# Patient Record
Sex: MALE | Race: WHITE | NOT HISPANIC OR LATINO | Employment: UNEMPLOYED | URBAN - METROPOLITAN AREA
[De-identification: names, ages, dates, MRNs, and addresses within clinical notes are randomized per-mention and may not be internally consistent; named-entity substitution may affect disease eponyms.]

---

## 2023-01-13 DIAGNOSIS — M79.641 RIGHT HAND PAIN: Primary | ICD-10-CM

## 2023-01-18 ENCOUNTER — OFFICE VISIT (OUTPATIENT)
Dept: ORTHOPEDICS | Facility: CLINIC | Age: 28
End: 2023-01-18
Payer: MEDICAID

## 2023-01-18 ENCOUNTER — HOSPITAL ENCOUNTER (OUTPATIENT)
Dept: RADIOLOGY | Facility: HOSPITAL | Age: 28
Discharge: HOME | End: 2023-01-18
Attending: STUDENT IN AN ORGANIZED HEALTH CARE EDUCATION/TRAINING PROGRAM
Payer: MEDICAID

## 2023-01-18 VITALS
DIASTOLIC BLOOD PRESSURE: 77 MMHG | HEART RATE: 67 BPM | TEMPERATURE: 99 F | HEIGHT: 68 IN | BODY MASS INDEX: 29.61 KG/M2 | RESPIRATION RATE: 18 BRPM | WEIGHT: 195.38 LBS | SYSTOLIC BLOOD PRESSURE: 116 MMHG

## 2023-01-18 DIAGNOSIS — S64.40XA DIGITAL NERVE LACERATION, FINGER, INITIAL ENCOUNTER: ICD-10-CM

## 2023-01-18 DIAGNOSIS — S66.821A FLEXOR TENDON LACERATION OF RIGHT HAND WITH OPEN WOUND, INITIAL ENCOUNTER: ICD-10-CM

## 2023-01-18 DIAGNOSIS — S61.401A FLEXOR TENDON LACERATION OF RIGHT HAND WITH OPEN WOUND, INITIAL ENCOUNTER: ICD-10-CM

## 2023-01-18 DIAGNOSIS — M79.641 RIGHT HAND PAIN: Primary | ICD-10-CM

## 2023-01-18 DIAGNOSIS — S66.120A LACERATION OF FLEXOR MUSCLE, FASCIA AND TENDON OF RIGHT INDEX FINGER AT WRIST AND HAND LEVEL, INITIAL ENCOUNTER: Primary | ICD-10-CM

## 2023-01-18 DIAGNOSIS — S66.124A LACERATION OF FLEXOR MUSCLE, FASCIA AND TENDON OF RIGHT RING FINGER AT WRIST AND HAND LEVEL, INITIAL ENCOUNTER: ICD-10-CM

## 2023-01-18 DIAGNOSIS — S66.122A LACERATION OF FLEXOR MUSCLE, FASCIA AND TENDON OF RIGHT MIDDLE FINGER AT WRIST AND HAND LEVEL, INITIAL ENCOUNTER: ICD-10-CM

## 2023-01-18 DIAGNOSIS — S66.126A LACERATION OF FLEXOR MUSCLE, FASCIA AND TENDON OF RIGHT LITTLE FINGER AT WRIST AND HAND LEVEL, INITIAL ENCOUNTER: ICD-10-CM

## 2023-01-18 DIAGNOSIS — S66.821A LACERATION OF FLEXOR TENDON OF RIGHT HAND, INITIAL ENCOUNTER: ICD-10-CM

## 2023-01-18 DIAGNOSIS — M79.641 RIGHT HAND PAIN: ICD-10-CM

## 2023-01-18 PROCEDURE — 73130 X-RAY EXAM OF HAND: CPT | Mod: TC,RT

## 2023-01-18 PROCEDURE — 3008F BODY MASS INDEX DOCD: CPT | Mod: CPTII,,, | Performed by: SPECIALIST

## 2023-01-18 PROCEDURE — 1159F PR MEDICATION LIST DOCUMENTED IN MEDICAL RECORD: ICD-10-PCS | Mod: CPTII,,, | Performed by: STUDENT IN AN ORGANIZED HEALTH CARE EDUCATION/TRAINING PROGRAM

## 2023-01-18 PROCEDURE — 3078F PR MOST RECENT DIASTOLIC BLOOD PRESSURE < 80 MM HG: ICD-10-PCS | Mod: CPTII,,, | Performed by: SPECIALIST

## 2023-01-18 PROCEDURE — 3074F SYST BP LT 130 MM HG: CPT | Mod: CPTII,,, | Performed by: SPECIALIST

## 2023-01-18 PROCEDURE — 3078F DIAST BP <80 MM HG: CPT | Mod: CPTII,,, | Performed by: SPECIALIST

## 2023-01-18 PROCEDURE — 71046 X-RAY EXAM CHEST 2 VIEWS: CPT | Mod: TC

## 2023-01-18 PROCEDURE — 99203 PR OFFICE/OUTPT VISIT, NEW, LEVL III, 30-44 MIN: ICD-10-PCS | Mod: S$PBB,,, | Performed by: SPECIALIST

## 2023-01-18 PROCEDURE — 3074F PR MOST RECENT SYSTOLIC BLOOD PRESSURE < 130 MM HG: ICD-10-PCS | Mod: CPTII,,, | Performed by: SPECIALIST

## 2023-01-18 PROCEDURE — 99204 OFFICE O/P NEW MOD 45 MIN: CPT | Mod: ,,, | Performed by: STUDENT IN AN ORGANIZED HEALTH CARE EDUCATION/TRAINING PROGRAM

## 2023-01-18 PROCEDURE — 3008F PR BODY MASS INDEX (BMI) DOCUMENTED: ICD-10-PCS | Mod: CPTII,,, | Performed by: SPECIALIST

## 2023-01-18 PROCEDURE — 99213 OFFICE O/P EST LOW 20 MIN: CPT | Mod: PBBFAC,25

## 2023-01-18 PROCEDURE — 99203 OFFICE O/P NEW LOW 30 MIN: CPT | Mod: S$PBB,,, | Performed by: SPECIALIST

## 2023-01-18 PROCEDURE — 99204 PR OFFICE/OUTPT VISIT, NEW, LEVL IV, 45-59 MIN: ICD-10-PCS | Mod: ,,, | Performed by: STUDENT IN AN ORGANIZED HEALTH CARE EDUCATION/TRAINING PROGRAM

## 2023-01-18 PROCEDURE — 1159F MED LIST DOCD IN RCRD: CPT | Mod: CPTII,,, | Performed by: STUDENT IN AN ORGANIZED HEALTH CARE EDUCATION/TRAINING PROGRAM

## 2023-01-18 RX ORDER — HYDROCODONE BITARTRATE AND ACETAMINOPHEN 7.5; 325 MG/1; MG/1
1 TABLET ORAL EVERY 6 HOURS PRN
COMMUNITY
Start: 2023-01-10

## 2023-01-18 RX ORDER — SULFAMETHOXAZOLE AND TRIMETHOPRIM 800; 160 MG/1; MG/1
1 TABLET ORAL 2 TIMES DAILY
COMMUNITY
Start: 2023-01-10 | End: 2023-01-20

## 2023-01-18 RX ORDER — SODIUM CHLORIDE 9 MG/ML
INJECTION, SOLUTION INTRAVENOUS CONTINUOUS
Status: CANCELLED | OUTPATIENT
Start: 2023-01-18

## 2023-01-18 NOTE — PROGRESS NOTES
[facility] Orthopaedic Surgery Clinic Note    In brief, Lul Porter is a 27 y.o. male who sustained a volar laceration to the small, ring, middle, index fingers when trying to carry a sharp piece of glass on 01/10/2023 during a home demolition/renovation.  States he was seen at an outside facility, where his wounds were irrigated and closed.  Was seen by a nonoperative orthopedic physician in [place] we then referred him to [facility], however when they contacted [facility] he was told that he would be several weeks before he he could get an appointment, so he traveled to [place] for further evaluation.  He denies fevers or chills or malodorous drainage.  He states he does have gross weakness in all fingers except the thumb with flexion however he does have full extension.  He does have some decreased sensation in the small finger and index finger.    He is right-hand dominant and works in construction.      PMH:   Past Medical History:   Diagnosis Date    Mild intermittent asthma, uncomplicated        PSH: History reviewed. No pertinent surgical history.    SH:   Social History     Socioeconomic History    Marital status: Single   Tobacco Use    Smoking status: Every Day     Types: Cigarettes    Smokeless tobacco: Current   Substance and Sexual Activity    Alcohol use: Never    Drug use: Never    Sexual activity: Yes       FH:   Family History   Problem Relation Age of Onset    Throat cancer Mother     Heart attack Father        Allergies: Review of patient's allergies indicates:  No Known Allergies    ROS:  Negative except for above    Physical Exam:  Vitals:    01/18/23 1327   BP: 116/77   Pulse: 67   Resp: 18   Temp: 98.5 °F (36.9 °C)       General: NAD  Cardio: RRR by peripheral pulse  Pulm: Normal WOB on room air, symmetric chest rise      MSK:    Right hand exam   Lacerations to zone 2 volar surface of small, ring, long, index fingers with nylon sutures in place, no gross purulence, no fluctuance, no increased  warmth  Decreased sensation to radial aspect of small finger and ulnar aspect of the index finger, otherwise gross sensation intact to all fingertips  Not able to flex at PIP or DP of small, ring, middle, index fingers;   Extension of all fingers at MCP, PIP, DI P grossly intact  AIN/PIN/ulnar nerve gross motor intact  Capillary refill brisk in all fingertips, radial pulse palpable      Imaging:   X-rays of the right hand demonstrate no obvious foreign bodies, no fractures, normal alignment of radiocarpal joint    Assessment:   27-year-old right-hand dominant male with zone 2 laceration of right small, ring, middle, index fingers with possible digital nerve injury to the small and index fingers, which occurred grabbing a sharp edge of glass on 1/10/23.  He was referred here today for initial surgical discussion, he was evaluated in [place] and referred to our clinic in [place].    -case discussed with Dr. Villanueva, who contacted a hand specialist within his practice for further management, we will refer the patient to his clinic today for surgical discussion    Niels Polanco MD  [facility]

## 2023-01-18 NOTE — PROGRESS NOTES
Chief Complaint:  Right hand lacerations to index, middle, ring, small fingers    Consulting Physician: No ref. provider found    History of present illness:    Patient is a 27-year-old right-hand-dominant male who injured his hand on a mirror on 01/10/2023.  He presented to the [facility] and was referred to me.  He complains of inability to flex his index, middle, ring, small fingers.  He is also unable to feel at the fingertips of all 4 digits.  He smokes currently.    Past Medical History:   Diagnosis Date    Mild intermittent asthma, uncomplicated        History reviewed. No pertinent surgical history.    Current Outpatient Medications   Medication Sig    HYDROcodone-acetaminophen (NORCO) 7.5-325 mg per tablet Take 1 tablet by mouth every 6 (six) hours as needed.    sulfamethoxazole-trimethoprim 800-160mg (BACTRIM DS) 800-160 mg Tab Take by mouth.     No current facility-administered medications for this visit.       Review of patient's allergies indicates:  No Known Allergies    Family History   Problem Relation Age of Onset    Throat cancer Mother     Heart attack Father        Social History     Socioeconomic History    Marital status: Single   Tobacco Use    Smoking status: Every Day     Types: Cigarettes    Smokeless tobacco: Current   Substance and Sexual Activity    Alcohol use: Never    Drug use: Never    Sexual activity: Yes       Review of Systems:    Constitution:   Denies chills, fever, and sweats.  HENT:   Denies headaches or blurry vision.  Cardiovascular:  Denies chest pain or irregular heart beat.  Respiratory:   Denies cough or shortness of breath.  Gastrointestinal:  Denies abdominal pain, nausea, or vomiting.  Musculoskeletal:   Denies muscle cramps.  Neurological:   Denies dizziness or focal weakness.  Psychiatric/Behavior: Normal mental status.  Hematology/Lymph:  Denies bleeding problem or easy bruising/bleeding.  Skin:    Denies rash or suspicious  lesions.    Examination:    Vital Signs:    Vitals:    01/18/23 1457   PainSc:   7       There is no height or weight on file to calculate BMI.    Constitution:   Well-developed, well nourished patient in no acute distress.  Neurological:   Alert and oriented x 3 and cooperative to examination.     Psychiatric/Behavior: Normal mental status.  Respiratory:   No shortness of breath.  Eyes:    Extraoccular muscles intact  Skin:    No scars, rash or suspicious lesions.    MSK:   Right hand:  Traumatic wounds over the volar aspect of all 4 digits in zone 2.  He is unable to flex his FDP or FDS of all 4 digits.  Two-point discrimination is greater than 15 on the radial side and ulnar side of all 4 digits except for the radial aspect of the ring finger where two-point discrimination is 8 mm.  All digits are warm and well perfused.    Imaging:   X-ray of the right hand shows no fractures     Assessment:  Zone 2 flexor tendon lacerations in index, middle, ring, small fingers.  Digital nerve lacerations in index, middle, ring, small fingers    Plan:  Will need an exploration of all 4 fingers with zone 2 flexor tendon repairs in index, middle, ring, small fingers.  I will also examined the nerve vascular bundles intraoperatively.  I will attempt to repair the nerves if possible but he may need nerve reconstructions with allograft.    I explained that surgery and the nature of their condition are not without risks. These include, but are not limited to, bleeding, infection, neurovascular compromise, wound complications, scarring, cosmetic defects, need for later and/or repeated surgeries, pain, loss of ROM, loss of function, deformity, functional abnormalities, stiffness, thromboembolic complications, compartment syndrome, loss of limb, loss of life, anesthetic complications, and other imponderables. I explained that these can occur despite the adequacy of treatments rendered, and that their risks are heightened given the  nature of their condition. They verbalized understanding. They would like to continue with surgery at this time. If appropriate family was involved with surgical discussion.     Follow Up:  After surgery  Xray at next visit:  None

## 2023-01-18 NOTE — H&P (VIEW-ONLY)
Chief Complaint:  Right hand lacerations to index, middle, ring, small fingers    Consulting Physician: No ref. provider found    History of present illness:    Patient is a 27-year-old right-hand-dominant male who injured his hand on a mirror on 01/10/2023.  He presented to the [facility] and was referred to me.  He complains of inability to flex his index, middle, ring, small fingers.  He is also unable to feel at the fingertips of all 4 digits.  He smokes currently.    Past Medical History:   Diagnosis Date    Mild intermittent asthma, uncomplicated        History reviewed. No pertinent surgical history.    Current Outpatient Medications   Medication Sig    HYDROcodone-acetaminophen (NORCO) 7.5-325 mg per tablet Take 1 tablet by mouth every 6 (six) hours as needed.    sulfamethoxazole-trimethoprim 800-160mg (BACTRIM DS) 800-160 mg Tab Take by mouth.     No current facility-administered medications for this visit.       Review of patient's allergies indicates:  No Known Allergies    Family History   Problem Relation Age of Onset    Throat cancer Mother     Heart attack Father        Social History     Socioeconomic History    Marital status: Single   Tobacco Use    Smoking status: Every Day     Types: Cigarettes    Smokeless tobacco: Current   Substance and Sexual Activity    Alcohol use: Never    Drug use: Never    Sexual activity: Yes       Review of Systems:    Constitution:   Denies chills, fever, and sweats.  HENT:   Denies headaches or blurry vision.  Cardiovascular:  Denies chest pain or irregular heart beat.  Respiratory:   Denies cough or shortness of breath.  Gastrointestinal:  Denies abdominal pain, nausea, or vomiting.  Musculoskeletal:   Denies muscle cramps.  Neurological:   Denies dizziness or focal weakness.  Psychiatric/Behavior: Normal mental status.  Hematology/Lymph:  Denies bleeding problem or easy bruising/bleeding.  Skin:    Denies rash or suspicious  lesions.    Examination:    Vital Signs:    Vitals:    01/18/23 1457   PainSc:   7       There is no height or weight on file to calculate BMI.    Constitution:   Well-developed, well nourished patient in no acute distress.  Neurological:   Alert and oriented x 3 and cooperative to examination.     Psychiatric/Behavior: Normal mental status.  Respiratory:   No shortness of breath.  Eyes:    Extraoccular muscles intact  Skin:    No scars, rash or suspicious lesions.    MSK:   Right hand:  Traumatic wounds over the volar aspect of all 4 digits in zone 2.  He is unable to flex his FDP or FDS of all 4 digits.  Two-point discrimination is greater than 15 on the radial side and ulnar side of all 4 digits except for the radial aspect of the ring finger where two-point discrimination is 8 mm.  All digits are warm and well perfused.    Imaging:   X-ray of the right hand shows no fractures     Assessment:  Zone 2 flexor tendon lacerations in index, middle, ring, small fingers.  Digital nerve lacerations in index, middle, ring, small fingers    Plan:  Will need an exploration of all 4 fingers with zone 2 flexor tendon repairs in index, middle, ring, small fingers.  I will also examined the nerve vascular bundles intraoperatively.  I will attempt to repair the nerves if possible but he may need nerve reconstructions with allograft.    I explained that surgery and the nature of their condition are not without risks. These include, but are not limited to, bleeding, infection, neurovascular compromise, wound complications, scarring, cosmetic defects, need for later and/or repeated surgeries, pain, loss of ROM, loss of function, deformity, functional abnormalities, stiffness, thromboembolic complications, compartment syndrome, loss of limb, loss of life, anesthetic complications, and other imponderables. I explained that these can occur despite the adequacy of treatments rendered, and that their risks are heightened given the  nature of their condition. They verbalized understanding. They would like to continue with surgery at this time. If appropriate family was involved with surgical discussion.     Follow Up:  After surgery  Xray at next visit:  None

## 2023-01-19 ENCOUNTER — ANESTHESIA EVENT (OUTPATIENT)
Dept: SURGERY | Facility: HOSPITAL | Age: 28
End: 2023-01-19
Payer: MEDICAID

## 2023-01-19 NOTE — PROGRESS NOTES
Faculty Attestation: Lul Porter  was seen at [facility] in the Orthopaedic Clinic. Patient seen and evaluated at the time of the visit. History of Present Illness, Physical Exam, and Assessment and Plan reviewed. Treatment plan is reasonable and appropriate. Compliance with treatment recommendations is important. No procedure was performed.     Mario Villanueva MD  Orthopaedic Surgery

## 2023-01-20 ENCOUNTER — ANESTHESIA (OUTPATIENT)
Dept: SURGERY | Facility: HOSPITAL | Age: 28
End: 2023-01-20
Payer: MEDICAID

## 2023-01-20 ENCOUNTER — HOSPITAL ENCOUNTER (OUTPATIENT)
Facility: HOSPITAL | Age: 28
Discharge: HOME | End: 2023-01-20
Attending: STUDENT IN AN ORGANIZED HEALTH CARE EDUCATION/TRAINING PROGRAM | Admitting: STUDENT IN AN ORGANIZED HEALTH CARE EDUCATION/TRAINING PROGRAM
Payer: MEDICAID

## 2023-01-20 DIAGNOSIS — S66.120A LACERATION OF FLEXOR MUSCLE, FASCIA AND TENDON OF RIGHT INDEX FINGER AT WRIST AND HAND LEVEL, INITIAL ENCOUNTER: ICD-10-CM

## 2023-01-20 DIAGNOSIS — S61.401A FLEXOR TENDON LACERATION OF RIGHT HAND WITH OPEN WOUND, INITIAL ENCOUNTER: ICD-10-CM

## 2023-01-20 DIAGNOSIS — S66.821A FLEXOR TENDON LACERATION OF RIGHT HAND WITH OPEN WOUND, INITIAL ENCOUNTER: ICD-10-CM

## 2023-01-20 DIAGNOSIS — S66.821A LACERATION OF FLEXOR TENDON OF RIGHT HAND, INITIAL ENCOUNTER: Primary | ICD-10-CM

## 2023-01-20 PROCEDURE — 63600175 PHARM REV CODE 636 W HCPCS: Performed by: NURSE ANESTHETIST, CERTIFIED REGISTERED

## 2023-01-20 PROCEDURE — 64831 PR REPAIR OF DIGIT NERVE: ICD-10-PCS | Mod: 59,RT,, | Performed by: STUDENT IN AN ORGANIZED HEALTH CARE EDUCATION/TRAINING PROGRAM

## 2023-01-20 PROCEDURE — 76942 ECHO GUIDE FOR BIOPSY: CPT | Performed by: ANESTHESIOLOGY

## 2023-01-20 PROCEDURE — 63600175 PHARM REV CODE 636 W HCPCS: Performed by: STUDENT IN AN ORGANIZED HEALTH CARE EDUCATION/TRAINING PROGRAM

## 2023-01-20 PROCEDURE — 26356 PR REPAIR FLEX TENDON,ZONE 2,HAND: ICD-10-PCS | Mod: 51,F6,, | Performed by: STUDENT IN AN ORGANIZED HEALTH CARE EDUCATION/TRAINING PROGRAM

## 2023-01-20 PROCEDURE — 36000707: Performed by: STUDENT IN AN ORGANIZED HEALTH CARE EDUCATION/TRAINING PROGRAM

## 2023-01-20 PROCEDURE — 63600175 PHARM REV CODE 636 W HCPCS

## 2023-01-20 PROCEDURE — 64831 REPAIR OF DIGIT NERVE: CPT | Mod: 59,RT,, | Performed by: STUDENT IN AN ORGANIZED HEALTH CARE EDUCATION/TRAINING PROGRAM

## 2023-01-20 PROCEDURE — 71000015 HC POSTOP RECOV 1ST HR: Performed by: STUDENT IN AN ORGANIZED HEALTH CARE EDUCATION/TRAINING PROGRAM

## 2023-01-20 PROCEDURE — 26356 REPAIR FINGER/HAND TENDON: CPT | Mod: 51,F6,, | Performed by: STUDENT IN AN ORGANIZED HEALTH CARE EDUCATION/TRAINING PROGRAM

## 2023-01-20 PROCEDURE — 37000008 HC ANESTHESIA 1ST 15 MINUTES: Performed by: STUDENT IN AN ORGANIZED HEALTH CARE EDUCATION/TRAINING PROGRAM

## 2023-01-20 PROCEDURE — 64912 NRV RPR W/NRV ALGRFT 1ST: CPT | Mod: RT,,, | Performed by: STUDENT IN AN ORGANIZED HEALTH CARE EDUCATION/TRAINING PROGRAM

## 2023-01-20 PROCEDURE — 27201423 OPTIME MED/SURG SUP & DEVICES STERILE SUPPLY: Performed by: STUDENT IN AN ORGANIZED HEALTH CARE EDUCATION/TRAINING PROGRAM

## 2023-01-20 PROCEDURE — 63600175 PHARM REV CODE 636 W HCPCS: Mod: TB,JG | Performed by: STUDENT IN AN ORGANIZED HEALTH CARE EDUCATION/TRAINING PROGRAM

## 2023-01-20 PROCEDURE — 25000003 PHARM REV CODE 250: Performed by: STUDENT IN AN ORGANIZED HEALTH CARE EDUCATION/TRAINING PROGRAM

## 2023-01-20 PROCEDURE — 35207: ICD-10-PCS | Mod: 51,F7,, | Performed by: STUDENT IN AN ORGANIZED HEALTH CARE EDUCATION/TRAINING PROGRAM

## 2023-01-20 PROCEDURE — 01810 ANES PX NRV MUSC F/ARM WRST: CPT | Performed by: STUDENT IN AN ORGANIZED HEALTH CARE EDUCATION/TRAINING PROGRAM

## 2023-01-20 PROCEDURE — 64832 PR REPAIR EACH ADDNL DIGIT NERVE: ICD-10-PCS | Mod: 59,RT,, | Performed by: STUDENT IN AN ORGANIZED HEALTH CARE EDUCATION/TRAINING PROGRAM

## 2023-01-20 PROCEDURE — 71000033 HC RECOVERY, INTIAL HOUR: Performed by: STUDENT IN AN ORGANIZED HEALTH CARE EDUCATION/TRAINING PROGRAM

## 2023-01-20 PROCEDURE — 64913 NRV RPR W/NRV ALGRFT EA ADDL: CPT | Mod: RT,,, | Performed by: STUDENT IN AN ORGANIZED HEALTH CARE EDUCATION/TRAINING PROGRAM

## 2023-01-20 PROCEDURE — 64913 PR NERVE REPAIR, W/NERVE ALLOGRAFT, EA ADDTL STRAND: ICD-10-PCS | Mod: 59,RT,, | Performed by: STUDENT IN AN ORGANIZED HEALTH CARE EDUCATION/TRAINING PROGRAM

## 2023-01-20 PROCEDURE — 27800903 OPTIME MED/SURG SUP & DEVICES OTHER IMPLANTS: Performed by: STUDENT IN AN ORGANIZED HEALTH CARE EDUCATION/TRAINING PROGRAM

## 2023-01-20 PROCEDURE — 36000706: Performed by: STUDENT IN AN ORGANIZED HEALTH CARE EDUCATION/TRAINING PROGRAM

## 2023-01-20 PROCEDURE — 25000003 PHARM REV CODE 250: Performed by: NURSE ANESTHETIST, CERTIFIED REGISTERED

## 2023-01-20 PROCEDURE — 37000009 HC ANESTHESIA EA ADD 15 MINS: Performed by: STUDENT IN AN ORGANIZED HEALTH CARE EDUCATION/TRAINING PROGRAM

## 2023-01-20 PROCEDURE — 64415 NJX AA&/STRD BRCH PLXS IMG: CPT | Mod: 59 | Performed by: STUDENT IN AN ORGANIZED HEALTH CARE EDUCATION/TRAINING PROGRAM

## 2023-01-20 PROCEDURE — 35207 RPR BLD VSL DIR HAND FINGER: CPT | Mod: 51,F7,, | Performed by: STUDENT IN AN ORGANIZED HEALTH CARE EDUCATION/TRAINING PROGRAM

## 2023-01-20 PROCEDURE — 64912 PR NERVE REPAIR, W/NERVE ALLOGRAFT, 1ST STRAND: ICD-10-PCS | Mod: RT,,, | Performed by: STUDENT IN AN ORGANIZED HEALTH CARE EDUCATION/TRAINING PROGRAM

## 2023-01-20 PROCEDURE — 64832 REPAIR NERVE ADD-ON: CPT | Mod: 59,RT,, | Performed by: STUDENT IN AN ORGANIZED HEALTH CARE EDUCATION/TRAINING PROGRAM

## 2023-01-20 PROCEDURE — 76942 ECHO GUIDE FOR BIOPSY: CPT | Performed by: STUDENT IN AN ORGANIZED HEALTH CARE EDUCATION/TRAINING PROGRAM

## 2023-01-20 DEVICE — GRAFT AVANCE NERVE 1-2MMX70MM: Type: IMPLANTABLE DEVICE | Site: HAND | Status: FUNCTIONAL

## 2023-01-20 RX ORDER — ROPIVACAINE HYDROCHLORIDE 5 MG/ML
INJECTION, SOLUTION EPIDURAL; INFILTRATION; PERINEURAL
Status: COMPLETED | OUTPATIENT
Start: 2023-01-20 | End: 2023-01-20

## 2023-01-20 RX ORDER — ROPIVACAINE HYDROCHLORIDE 5 MG/ML
INJECTION, SOLUTION EPIDURAL; INFILTRATION; PERINEURAL
Status: COMPLETED
Start: 2023-01-20 | End: 2023-01-20

## 2023-01-20 RX ORDER — METHOCARBAMOL 500 MG/1
500 TABLET, FILM COATED ORAL EVERY 6 HOURS PRN
Status: CANCELLED | OUTPATIENT
Start: 2023-01-20

## 2023-01-20 RX ORDER — SODIUM CHLORIDE 9 MG/ML
INJECTION, SOLUTION INTRAVENOUS CONTINUOUS
Status: CANCELLED | OUTPATIENT
Start: 2023-01-20

## 2023-01-20 RX ORDER — ACETAMINOPHEN 10 MG/ML
1000 INJECTION, SOLUTION INTRAVENOUS ONCE
Status: DISCONTINUED | OUTPATIENT
Start: 2023-01-20 | End: 2023-01-20 | Stop reason: HOSPADM

## 2023-01-20 RX ORDER — KETOROLAC TROMETHAMINE 30 MG/ML
INJECTION, SOLUTION INTRAMUSCULAR; INTRAVENOUS
Status: DISCONTINUED
Start: 2023-01-20 | End: 2023-01-20 | Stop reason: HOSPADM

## 2023-01-20 RX ORDER — ONDANSETRON 2 MG/ML
INJECTION INTRAMUSCULAR; INTRAVENOUS
Status: DISCONTINUED | OUTPATIENT
Start: 2023-01-20 | End: 2023-01-20

## 2023-01-20 RX ORDER — ACETAMINOPHEN 10 MG/ML
1000 INJECTION, SOLUTION INTRAVENOUS EVERY 8 HOURS
Status: DISCONTINUED | OUTPATIENT
Start: 2023-01-20 | End: 2023-01-20

## 2023-01-20 RX ORDER — HYDROCODONE BITARTRATE AND ACETAMINOPHEN 5; 325 MG/1; MG/1
1 TABLET ORAL EVERY 4 HOURS PRN
Status: CANCELLED | OUTPATIENT
Start: 2023-01-20

## 2023-01-20 RX ORDER — CEFAZOLIN SODIUM 2 G/100ML
2 INJECTION, SOLUTION INTRAVENOUS
Status: DISCONTINUED | OUTPATIENT
Start: 2023-01-20 | End: 2023-01-20 | Stop reason: HOSPADM

## 2023-01-20 RX ORDER — METOCLOPRAMIDE HYDROCHLORIDE 5 MG/ML
10 INJECTION INTRAMUSCULAR; INTRAVENOUS EVERY 6 HOURS PRN
Status: CANCELLED | OUTPATIENT
Start: 2023-01-20

## 2023-01-20 RX ORDER — HEPARIN SODIUM 200 [USP'U]/100ML
INJECTION, SOLUTION INTRAVENOUS
Status: DISCONTINUED
Start: 2023-01-20 | End: 2023-01-20 | Stop reason: HOSPADM

## 2023-01-20 RX ORDER — SUCCINYLCHOLINE CHLORIDE 20 MG/ML
INJECTION INTRAMUSCULAR; INTRAVENOUS
Status: DISCONTINUED | OUTPATIENT
Start: 2023-01-20 | End: 2023-01-20

## 2023-01-20 RX ORDER — LIDOCAINE HYDROCHLORIDE 20 MG/ML
INJECTION, SOLUTION EPIDURAL; INFILTRATION; INTRACAUDAL; PERINEURAL
Status: DISCONTINUED
Start: 2023-01-20 | End: 2023-01-20 | Stop reason: HOSPADM

## 2023-01-20 RX ORDER — MIDAZOLAM HYDROCHLORIDE 1 MG/ML
2 INJECTION INTRAMUSCULAR; INTRAVENOUS ONCE
Status: COMPLETED | OUTPATIENT
Start: 2023-01-20 | End: 2023-01-20

## 2023-01-20 RX ORDER — MIDAZOLAM HYDROCHLORIDE 1 MG/ML
INJECTION INTRAMUSCULAR; INTRAVENOUS
Status: COMPLETED
Start: 2023-01-20 | End: 2023-01-20

## 2023-01-20 RX ORDER — DEXAMETHASONE SODIUM PHOSPHATE 4 MG/ML
INJECTION, SOLUTION INTRA-ARTICULAR; INTRALESIONAL; INTRAMUSCULAR; INTRAVENOUS; SOFT TISSUE
Status: DISCONTINUED | OUTPATIENT
Start: 2023-01-20 | End: 2023-01-20

## 2023-01-20 RX ORDER — PAPAVERINE HYDROCHLORIDE 30 MG/ML
INJECTION INTRAMUSCULAR; INTRAVENOUS
Status: DISCONTINUED
Start: 2023-01-20 | End: 2023-01-20 | Stop reason: HOSPADM

## 2023-01-20 RX ORDER — ONDANSETRON 2 MG/ML
4 INJECTION INTRAMUSCULAR; INTRAVENOUS EVERY 6 HOURS PRN
Status: CANCELLED | OUTPATIENT
Start: 2023-01-20

## 2023-01-20 RX ORDER — ROPIVACAINE HYDROCHLORIDE 5 MG/ML
INJECTION, SOLUTION EPIDURAL; INFILTRATION; PERINEURAL
Status: DISCONTINUED | OUTPATIENT
Start: 2023-01-20 | End: 2023-01-20

## 2023-01-20 RX ORDER — FENTANYL CITRATE 50 UG/ML
INJECTION, SOLUTION INTRAMUSCULAR; INTRAVENOUS
Status: DISCONTINUED | OUTPATIENT
Start: 2023-01-20 | End: 2023-01-20

## 2023-01-20 RX ORDER — ROCURONIUM BROMIDE 10 MG/ML
INJECTION, SOLUTION INTRAVENOUS
Status: DISCONTINUED | OUTPATIENT
Start: 2023-01-20 | End: 2023-01-20

## 2023-01-20 RX ORDER — LIDOCAINE HYDROCHLORIDE 20 MG/ML
INJECTION, SOLUTION EPIDURAL; INFILTRATION; INTRACAUDAL; PERINEURAL
Status: DISCONTINUED | OUTPATIENT
Start: 2023-01-20 | End: 2023-01-20 | Stop reason: HOSPADM

## 2023-01-20 RX ORDER — SODIUM CHLORIDE 9 MG/ML
INJECTION, SOLUTION INTRAVENOUS CONTINUOUS
Status: DISCONTINUED | OUTPATIENT
Start: 2023-01-20 | End: 2023-01-20 | Stop reason: HOSPADM

## 2023-01-20 RX ORDER — ACETAMINOPHEN 10 MG/ML
INJECTION, SOLUTION INTRAVENOUS
Status: COMPLETED
Start: 2023-01-20 | End: 2023-01-20

## 2023-01-20 RX ORDER — MORPHINE SULFATE 4 MG/ML
4 INJECTION, SOLUTION INTRAMUSCULAR; INTRAVENOUS
Status: CANCELLED | OUTPATIENT
Start: 2023-01-20

## 2023-01-20 RX ORDER — PROPOFOL 10 MG/ML
VIAL (ML) INTRAVENOUS
Status: DISCONTINUED | OUTPATIENT
Start: 2023-01-20 | End: 2023-01-20

## 2023-01-20 RX ORDER — OXYCODONE AND ACETAMINOPHEN 10; 325 MG/1; MG/1
1 TABLET ORAL EVERY 4 HOURS PRN
Qty: 28 TABLET | Refills: 0 | Status: SHIPPED | OUTPATIENT
Start: 2023-01-20 | End: 2023-01-24 | Stop reason: SDUPTHER

## 2023-01-20 RX ORDER — PAPAVERINE HYDROCHLORIDE 30 MG/ML
INJECTION INTRAMUSCULAR; INTRAVENOUS
Status: DISCONTINUED | OUTPATIENT
Start: 2023-01-20 | End: 2023-01-20 | Stop reason: HOSPADM

## 2023-01-20 RX ORDER — KETOROLAC TROMETHAMINE 30 MG/ML
30 INJECTION, SOLUTION INTRAMUSCULAR; INTRAVENOUS ONCE
Status: DISCONTINUED | OUTPATIENT
Start: 2023-01-20 | End: 2023-01-20 | Stop reason: HOSPADM

## 2023-01-20 RX ADMIN — FENTANYL CITRATE 100 MCG: 50 INJECTION, SOLUTION INTRAMUSCULAR; INTRAVENOUS at 06:01

## 2023-01-20 RX ADMIN — ROPIVACAINE HYDROCHLORIDE 40 ML: 5 INJECTION, SOLUTION EPIDURAL; INFILTRATION; PERINEURAL at 06:01

## 2023-01-20 RX ADMIN — SUGAMMADEX 200 MG: 100 INJECTION, SOLUTION INTRAVENOUS at 06:01

## 2023-01-20 RX ADMIN — MIDAZOLAM HYDROCHLORIDE 2 MG: 1 INJECTION INTRAMUSCULAR; INTRAVENOUS at 06:01

## 2023-01-20 RX ADMIN — MIDAZOLAM HYDROCHLORIDE 2 MG: 1 INJECTION, SOLUTION INTRAMUSCULAR; INTRAVENOUS at 11:01

## 2023-01-20 RX ADMIN — ACETAMINOPHEN 1000 MG: 10 INJECTION, SOLUTION INTRAVENOUS at 06:01

## 2023-01-20 RX ADMIN — SODIUM CHLORIDE, SODIUM GLUCONATE, SODIUM ACETATE, POTASSIUM CHLORIDE AND MAGNESIUM CHLORIDE: 526; 502; 368; 37; 30 INJECTION, SOLUTION INTRAVENOUS at 12:01

## 2023-01-20 RX ADMIN — ACETAMINOPHEN 1000 MG: 10 INJECTION INTRAVENOUS at 06:01

## 2023-01-20 RX ADMIN — MIDAZOLAM HYDROCHLORIDE 2 MG: 1 INJECTION, SOLUTION INTRAMUSCULAR; INTRAVENOUS at 06:01

## 2023-01-20 RX ADMIN — ROPIVACAINE HYDROCHLORIDE 30 ML: 5 INJECTION, SOLUTION EPIDURAL; INFILTRATION; PERINEURAL at 11:01

## 2023-01-20 RX ADMIN — ONDANSETRON HYDROCHLORIDE 4 MG: 2 SOLUTION INTRAMUSCULAR; INTRAVENOUS at 04:01

## 2023-01-20 RX ADMIN — ROCURONIUM BROMIDE 45 MG: 10 SOLUTION INTRAVENOUS at 12:01

## 2023-01-20 RX ADMIN — ROCURONIUM BROMIDE 5 MG: 10 SOLUTION INTRAVENOUS at 12:01

## 2023-01-20 RX ADMIN — ROCURONIUM BROMIDE 20 MG: 10 SOLUTION INTRAVENOUS at 01:01

## 2023-01-20 RX ADMIN — SODIUM CHLORIDE, SODIUM GLUCONATE, SODIUM ACETATE, POTASSIUM CHLORIDE AND MAGNESIUM CHLORIDE: 526; 502; 368; 37; 30 INJECTION, SOLUTION INTRAVENOUS at 01:01

## 2023-01-20 RX ADMIN — CEFAZOLIN SODIUM 2 G: 2 INJECTION, SOLUTION INTRAVENOUS at 04:01

## 2023-01-20 RX ADMIN — SUCCINYLCHOLINE CHLORIDE 160 MG: 20 INJECTION, SOLUTION INTRAMUSCULAR; INTRAVENOUS at 12:01

## 2023-01-20 RX ADMIN — CEFAZOLIN SODIUM 2 G: 2 INJECTION, SOLUTION INTRAVENOUS at 12:01

## 2023-01-20 RX ADMIN — PROPOFOL 200 MG: 10 INJECTION, EMULSION INTRAVENOUS at 12:01

## 2023-01-20 RX ADMIN — ROCURONIUM BROMIDE 20 MG: 10 SOLUTION INTRAVENOUS at 04:01

## 2023-01-20 RX ADMIN — FENTANYL CITRATE 100 MCG: 50 INJECTION, SOLUTION INTRAMUSCULAR; INTRAVENOUS at 12:01

## 2023-01-20 RX ADMIN — MIDAZOLAM HYDROCHLORIDE 2 MG: 1 INJECTION INTRAMUSCULAR; INTRAVENOUS at 11:01

## 2023-01-20 RX ADMIN — ROCURONIUM BROMIDE 10 MG: 10 SOLUTION INTRAVENOUS at 03:01

## 2023-01-20 RX ADMIN — DEXAMETHASONE SODIUM PHOSPHATE 8 MG: 4 INJECTION, SOLUTION INTRA-ARTICULAR; INTRALESIONAL; INTRAMUSCULAR; INTRAVENOUS; SOFT TISSUE at 12:01

## 2023-01-20 NOTE — ANESTHESIA PROCEDURE NOTES
Right single shot supraclavicular block    Patient location during procedure: pre-op   Block not for primary anesthetic.  Reason for block: at surgeon's request and post-op pain management   Post-op Pain Location: Right hand pain   Start time: 1/20/2023 11:27 AM  Timeout: 1/20/2023 11:25 AM   End time: 1/20/2023 11:28 AM    Staffing  Authorizing Provider: Cipriano Galeano MD  Performing Provider: Cipriano Galeano MD    Preanesthetic Checklist  Completed: patient identified, IV checked, site marked, risks and benefits discussed, surgical consent, monitors and equipment checked, pre-op evaluation and timeout performed  Peripheral Block  Patient position: supine  Prep: ChloraPrep  Patient monitoring: heart rate, cardiac monitor, continuous pulse ox, continuous capnometry and frequent blood pressure checks  Block type: supraclavicular  Laterality: right  Injection technique: single shot  Needle  Needle type: Stimuplex   Needle gauge: 20 G  Needle length: 4 in  Needle localization: anatomical landmarks and ultrasound guidance   -ultrasound image captured on disc.  Assessment  Injection assessment: negative aspiration, negative parasthesia and local visualized surrounding nerve  Paresthesia pain: none  Heart rate change: no  Slow fractionated injection: yes    Medications:    Medications: ropivacaine (NAROPIN) injection 0.5% - Perineural   30 mL - 1/20/2023 11:28:00 AM    Additional Notes  Straightforward block, good view of the artery, needle guided adjacent to artery via corner pocket technique, no complications with local dosing, no paresthesias.  VSS.  [facility] RN monitoring vitals throughout procedure.  Patient tolerated procedure well.    Waleska GROVER

## 2023-01-20 NOTE — ANESTHESIA PREPROCEDURE EVALUATION
01/20/2023  Lul Porter is a 27 y.o., male.    Na 140, K 3.7, Cr 0.89  15 / 46 / 315k    Daily marijuana usage, last usage yesterday     Pre-op Assessment    I have reviewed the Patient Summary Reports.     I have reviewed the Nursing Notes. I have reviewed the NPO Status.   I have reviewed the Medications.     Review of Systems  Anesthesia Hx:   Denies Personal Hx of Anesthesia complications.   Hematology/Oncology:  Hematology Normal   Oncology Normal     EENT/Dental:EENT/Dental Normal   Cardiovascular:  Cardiovascular Normal Exercise tolerance: good  Denies Hypertension.     Pulmonary:   Asthma    Renal/:  Renal/ Normal     Hepatic/GI:  Hepatic/GI Normal    Musculoskeletal:  Musculoskeletal Normal    Neurological:  Neurology Normal    Psych:  Psychiatric Normal           Physical Exam  General: Well nourished, Alert and Cooperative    Airway:  Mallampati: II   Mouth Opening: Normal  TM Distance: Normal  Tongue: Normal    Dental:  Partial Dentures, Edentulous        Anesthesia Plan  Type of Anesthesia, risks & benefits discussed:    Anesthesia Type: Gen ETT  Intra-op Monitoring Plan: Standard ASA Monitors  Post Op Pain Control Plan: multimodal analgesia and peripheral nerve block  Induction:  IV  Informed Consent: Informed consent signed with the Patient and all parties understand the risks and agree with anesthesia plan.  All questions answered.   ASA Score: 2  Day of Surgery Review of History & Physical: H&P Update referred to the surgeon/provider.    Ready For Surgery From Anesthesia Perspective.     .

## 2023-01-21 NOTE — OR NURSING
1840  procedure time out performed for rt supraclav block, all agree  1845  started  1846  u/s guided rt supraclav block completed, pt elsy well, vss, resp full and regular, continuous monitoring.

## 2023-01-21 NOTE — ANESTHESIA POSTPROCEDURE EVALUATION
Anesthesia Post Evaluation    Patient: Lul Porter    Procedure(s) Performed: Procedure(s) (LRB):  REPAIR, TENDON, FLEXOR  Two tendons each in Four fingers. MAC and supraclav (Right)  REPAIR, NERVE USING ALLOGRAFT  Both nerves in 4 fingers (Right)    Final Anesthesia Type: general      Patient location during evaluation: PACU  Patient participation: Yes- Able to Participate  Level of consciousness: awake and alert  Post-procedure vital signs: reviewed and stable  Pain management: adequate (pt had block preop as well as in PACU for postop pain control - doing well  c/o headache )  Airway patency: patent    PONV status at discharge: No PONV  Anesthetic complications: no      Cardiovascular status: hemodynamically stable  Respiratory status: unassisted  Hydration status: euvolemic  Follow-up not needed.          Vitals Value Taken Time   /90 01/20/23 1851   Temp ** 01/20/23 1856   Pulse 97 01/20/23 1855   Resp 18 01/20/23 1855   SpO2 88 % 01/20/23 1855   Vitals shown include unvalidated device data.      No case tracking events are documented in the log.      Pain/Muna Score: No data recorded

## 2023-01-21 NOTE — ANESTHESIA PROCEDURE NOTES
Peripheral Block    Patient location during procedure: pre-op   Block not for primary anesthetic.  Reason for block: at surgeon's request and post-op pain management   Post-op Pain Location: Right Hand- repeat postop pain block in PACU due to length of case   Start time: 1/20/2023 6:45 PM  Timeout: 1/20/2023 6:43 PM   End time: 1/20/2023 6:46 PM    Staffing  Authorizing Provider: Bess Polanco MD  Performing Provider: Bess Polanco MD    Preanesthetic Checklist  Completed: patient identified, IV checked, site marked, risks and benefits discussed, surgical consent, monitors and equipment checked, pre-op evaluation and timeout performed  Peripheral Block  Patient position: supine  Prep: ChloraPrep  Patient monitoring: heart rate, cardiac monitor, continuous pulse ox, continuous capnometry and frequent blood pressure checks  Block type: supraclavicular  Laterality: right  Injection technique: single shot  Needle  Needle type: Stimuplex   Needle gauge: 22 G  Needle length: 2 in  Needle localization: anatomical landmarks and ultrasound guidance   -ultrasound image captured on disc.  Assessment  Injection assessment: negative aspiration, negative parasthesia and local visualized surrounding nerve  Paresthesia pain: none  Heart rate change: no  Slow fractionated injection: yes  Pain Tolerance: comfortable throughout block and no complaints  Medications:    Medications: ropivacaine (NAROPIN) injection 0.5% - Perineural   40 mL - 1/20/2023 6:46:00 PM    Additional Notes  VSS.  RN monitoring vitals throughout procedure.  Patient tolerated procedure well.  Pt had previous block but due to length of surgery (over 6 hours) pt was feeling pain in hand so discussed with pt to repeat the block  IV Sedation used and titrated for patient comfort-versed 2mg IV and toradol 30mg IV as well as Ofirmev 1gm prior to block  Ultrasound guidance used to visualize the nerve bundle and sheath as well as confirm needle placement and  deposition of the local anesthetic.  (1) Under ultrasound guidance, needle was inserted and placed in close proximity to the nerve bundle  (2) Ultrasound was also used to visualize the spread of the anesthetic in close proximity to the brachial plexus  (3) The nerves appeared anatomically normal  (4) There were no apparent abnormal pathological findings    Ultrasound photos archived.  Pt tolerated procedure well. There were no immediate complications.

## 2023-01-21 NOTE — OP NOTE
Operative Note    Patient Information:  Lul Porter    Date of Surgery:  01/20/2023    Surgeon:  Artur Adame MD    Assistant:  None    Pre-operative Diagnosis:  Lacerations of the right index, middle, ring, small fingers with associated flexor tendon and digit injuries in zone 2    Post-operative Diagnosis:  Open arthrotomy right index finger proximal interphalangeal joint  Open arthrotomy right middle finger proximal interphalangeal joint  Open arthrotomy right ring finger proximal interphalangeal  Open arthrotomy right little finger proximal interphalangeal joint  Right index finger volar plate laceration  Right middle finger volar plate laceration  Right ring finger volar plate laceration  Right little finger volar plate laceration  Right index finger flexor digitorum superficialis tendon laceration in zone 2  Right index finger flexor digitorum profundus tendon laceration in zone 2  Right middle finger flexor digitorum superficialis tendon laceration in zone 2  Right middle finger flexor digitorum profundus tendon laceration in zone 2  Right ring finger flexor digitorum superficialis tendon laceration in zone 2  Right ring finger flexor digitorum profundus tendon laceration in zone 2  Right little finger flexor digitorum superficialis tendon laceration in zone 2  Right little finger flexor digitorum profundus tendon laceration in zone 2  Right index finger radial digital nerve laceration  Right index finger radial digital artery laceration  Right index finger ulnar digital nerve laceration  Right middle finger radial digital nerve laceration  Right middle finger radial digital artery laceration  Right middle finger ulnar digital nerve laceration  Right middle finger ulnar digital artery laceration  Right ring finger radial digital nerve laceration  Right ring finger radial digital artery laceration  Right little finger radial digital nerve laceration  Right little finger ulnar digital nerve  laceration  Right little finger ulnar digital artery laceration    Procedure Performed:  Excisional debridement of skin, subcutaneous tissue, fascia, tendon, synovium at site of open arthrotomy right index finger proximal interphalangeal joint (CPT 95763)  Excisional debridement of skin, subcutaneous tissue, fascia, tendon, synovium at site of open arthrotomy right middle finger proximal interphalangeal joint (CPT 12029)  Excisional debridement of skin, subcutaneous tissue, fascia, tendon, synovium at site of open arthrotomy right ring finger proximal interphalangeal joint (CPT 34035)  Excisional debridement of skin, subcutaneous tissue, fascia, tendon, synovium at site of open arthrotomy right little finger proximal interphalangeal joint (CPT 48727)  Right index finger volar plate repair at proximal interphalangeal joint (CPT 67205)   Right middle finger volar plate repair at proximal interphalangeal joint (CPT 28581)   Right ring finger volar plate repair at proximal interphalangeal joint (CPT 71453)   Right little finger volar plate repair at proximal interphalangeal joint (CPT 74484)   Right index finger flexor digitorum superficialis tendon repair in zone (CPT 69636)   Right index finger flexor digitorum profundus tendon repair in zone 2 (CPT 58792)   Right middle finger flexor digitorum superficialis tendon repair in zone 2 (CPT 20935)   Right middle finger flexor digitorum profundus tendon repair in zone 2 (CPT 63669)   Right ring finger flexor digitorum superficialis tendon repair in zone 2 (CPT 48601)   Right ring finger flexor digitorum profundus tendon repair in zone 2 (CPT 04954)   Right little finger flexor digitorum superficialis tendon repair in zone 2 (CPT 45602)   Right little finger flexor digitorum profundus tendon repair in zone 2 (CPT 93691)   Right index finger radial digital nerve reconstruction with allograft (CPT 09222)   Right index finger ulnar digital nerve reconstruction with allograft  (CPT 78116)   Right middle finger radial digital nerve reconstruction with allograft (CPT 49255)   Right middle finger radial digital artery repair (CPT 25283)   Right middle finger ulnar digital nerve reconstruction with allograft (CPT 30620)   Right ring finger radial digital nerve repair (CPT 66585)   Right little finger radial digital nerve repair (CPT 19809)  Right little finger ulnar digital nerve reconstruction with allograft (CPT 55653)   Use of intraoperative microscope for micro surgery (CPT 63816)     Anesthesia:  See anesthesia record    Complications:  None    Blood Loss:  See anesthesia record    Specimens:  none    Implants:  Implant Name Type Inv. Item Serial No.  Lot No. LRB No. Used Action   GRAFT AVANCE NERVE [id number] - [id number]  GRAFT AVANCE NERVE [id number]  AXOGEN [id number] Right 1 Implanted   vistaseal     [id number] Right 1 Implanted        Indications for Procedure:  Lul Porter is a 27 y.o. male that cut his right hand while   Trying to carry a large sharp piece of glass during a home demolition.  He was seen in my clinic and diagnosed with multiple flexor tendon injuries of the right index, middle, ring, small fingers with associated digital nerve injuries.  He was booked and consented for surgery    Risks and benefits of the procedure were discussed with the patient. I explained that surgery and the nature of their condition are not without risks. These include, but are not limited to, bleeding, infection, neurovascular compromise, wound complications, scarring, cosmetic defects, need for later and/or repeated surgeries, pain, loss of ROM, loss of function, deformity, functional abnormalities, stiffness, thromboembolic complications, compartment syndrome, loss of limb, loss of life, anesthetic complications, and other imponderables. I explained that these can occur despite the adequacy of treatments rendered, and that their risks are heightened given the nature of  their condition. They verbalized understanding. They would like to continue with surgery at this time. If appropriate family was involved with surgical discussion. The patient expressed understanding of and agreement with the plan. Informed consent was obtained and signed prior going to the operative room.    Procedure in Detail:  Patient was brought to the operating room by the anesthesia team. Anesthesia was administered per the anesthesia record. The patient was left on the stretcher and a hand table was placed on the side of the bed.     Time out was performed and all parties present agreed with correct patient, correct procedure, correct side, correct site. The operative extremity was prepped and draped in standard normal fashion.     Pre-incision antibiotics were administered prior to skin incision. The tourniquet was inflated to 250mm HG.     Mid axial incisions were used with Brunner's proximally and distally over the index, middle, ring, small fingers.  In all fingers dissection was carried down to flexor tendon sheath and the skin flaps were elevated in a radial direction.    The index finger was examined 1st.  There was an open arthrotomy of the right index proximal interphalangeal joint with a laceration to the volar plate.  There was a flexor digitorum superficialis tendon laceration as well as a flexor digitorum profundus tendon laceration both in zone 2.  There was a radial digital nerve laceration, radial digital artery laceration, ulnar digital nerve laceration.  The ulnar digital artery was intact.    Next the middle finger was examined.  There was open arthrotomy of the proximal interphalangeal joint with a laceration to the volar plate.  There was a flexor digitorum superficialis tendon laceration as well as a flexor digitorum profundus tendon laceration both in zone 2.  The radial digital artery and nerve were both lacerated.  The ulnar digital artery and nerve were both lacerated.    Next the  ring finger was examined.  There was an open arthrotomy of the proximal interphalangeal joint with a laceration to the volar plate.  There was a flexor digitorum superficialis tendon laceration as well as a flexor digitorum profundus tendon laceration both in zone 2.  The radial digital artery and nerve were both lacerated.  The ulnar digital artery and nerve were both intact.    Next the little finger was examined.  There was an open arthrotomy of the proximal interphalangeal joint with laceration to the volar plate.  There was a flexor digitorum superficialis tendon laceration as well as a flexor digitorum profundus tendon laceration both in zone 2.  The radial digital nerve was lacerated.  The radial digital artery was intact.  The ulnar digital nerve and artery were both lacerated.    I then performed a structure by structure repair by repairing the same structure in all fingers before proceeding to the next structure in all fingers.    I started by performing an excisional debridement of the arthrotomies of all 4 fingers.  First I addressed the index finger.  An excisional debridement at the site of the open arthrotomy of the PIP joint of skin, subcutaneous tissue, tendon sheath, tendon, synovium was performed with scissors.  The joint was copiously irrigated and any foreign debris was removed from within the joint. Next I addressed the middle finger.  An excisional debridement at the site of the open arthrotomy of the PIP joint of skin, subcutaneous tissue, tendon sheath, tendon, synovium was performed with scissors.  The joint was copiously irrigated and any foreign debris was removed from within the joint.  Next I addressed the ring finger.  An excisional debridement at the site of the open arthrotomy of the PIP joint of skin, subcutaneous tissue, tendon sheath, tendon, synovium was performed with scissors.  The joint was copiously irrigated and any foreign debris was removed from within the joint.  Next I  addressed the little finger.  An excisional debridement at the site of the open arthrotomy of the PIP joint of skin, subcutaneous tissue, tendon sheath, tendon, synovium was performed with scissors.  The joint was copiously irrigated and any foreign debris was removed from within the joint.    I started by repairing the volar plates.  The volar plate over the index finger proximal interphalangeal joint was repaired with 5 0 Vicryl suture.  The volar plate over the middle finger proximal interphalangeal joint was repaired with 5 0 Vicryl suture.  The volar plate over the ring finger proximal interphalangeal joint was repaired with 5 0 Vicryl suture.  The volar plate over the little finger proximal interphalangeal joint was repaired with 5 0 Vicryl suture.    All of the flexor tendon sheaths were empty with the exception of the little finger.  There was a small remnant of the radial slip of the flexor digitorum superficialis insertion that had remained intact.  The slip on the ulnar side of the flexor digitorum superficialis was completely lacerated.  I repaired this ulnar slip with 4-0 Supramid suture using a 4 strand modified Eric technique.    I then made an incision over the distal palmar crease in a transverse fashion across the entire palm so that I could have access to all four flexor tendon sheaths.  Dissection was carried through subcutaneous tissue and care was taken to protect the neurovascular bundles on either side of the flexor tendon sheaths.  Identified the flexor tendon sheaths of the index, middle, ring, small fingers.  In the sheaths of the index, middle, ring fingers there were 2 tendons that were trapped at the site of the A1 pulley.  In the little finger there was only the flexor digitorum profundus that was trapped at the site of the A1 pulley.    I started with the index finger.  The A1 pulley was incised and the flexor digitorum profundus and flexor digitorum superficialis tendons were  brought out of the wound.  A grasping suture was placed in a Conroy tendon Passer was inserted retrograde from the site of the A3 pulley through the flexor tendon sheath in a retrograde direction and out of the wound at the site of the A1 pulley.  The A2 and A4 pulleys were intact. The radial slip of the flexor digitorum superficialis was excised.  Both the flexor digitorum superficialis and flexor digitorum profundus were passed through the tendon sheath and brought out of the sheath at the site of the A3 pulley.  The flexor digitorum superficialis was repaired in zone 2 with 4-0 Supramid suture using a 4 strand modified Eric technique.  The flexor digitorum profundus were repaired in zone 2 with 4-0 Supramid suture using an 8 strand modified Eric technique.    Next I addressed the middle finger.  The A1 pulley was incised and the flexor digitorum profundus and flexor digitorum superficialis tendons were brought out of the wound.  A grasping suture was placed in a Conroy tendon Passer was inserted retrograde from the site of the A3 pulley through the flexor tendon sheath in a retrograde direction and out of the wound at the site of the A1 pulley.  The A2 and A4 pulleys  were intact. The ulnar slip of the flexor digitorum superficialis was excised.  Both the flexor digitorum superficialis and flexor digitorum profundus were passed through the tendon sheath and brought out of the sheath at the site of the A3 pulley.  The flexor digitorum superficialis was repaired in zone 2 with 4-0 Supramid suture using a 4 strand modified Eric technique.  The flexor digitorum profundus were repaired in zone 2 with 4-0 Supramid suture using an 8 strand modified Eric technique.    Next I addressed the ring finger.  The A1 pulley was incised and the flexor digitorum profundus and flexor digitorum superficialis tendons were brought out of the wound.  A grasping suture was placed in a Conroy tendon Passer was inserted  retrograde from the site of the A3 pulley through the flexor tendon sheath in a retrograde direction and out of the wound at the site of the A1 pulley.  The A2 and A4 pulleys were intact.  The ulnar slip of the flexor digitorum superficialis was excised.  Both the flexor digitorum superficialis and flexor digitorum profundus were passed through the tendon sheath and brought out of the sheath at the site of the A3 pulley.  The flexor digitorum superficialis was repaired in zone 2 with 4-0 Supramid suture using a 4 strand modified Eric technique.  The flexor digitorum profundus were repaired in zone 2 with 4-0 Supramid suture using an 8 strand modified Eric technique.    Next I addressed the little finger.  Since I would previously repaired the flexor digitorum superficialis tendon I only had to repair the flexor digitorum profundus tendon.  The A1 pulley was incised and the flexor digitorum profundus tendon was brought out of the wound at the site of the A1 pulley.  A grasping suture was placed in a Conroy tendon Passer was inserted retrograde at the site of the A3 pulley through the flexor tendon sheath and brought out of the wound at the site of the A1 pulley.  The A4 pulley had been completely disrupted by the trauma.  The A2 pulley was partially intact.  Next the flexor digitorum profundus tendon was passed through the tendon sheath and brought out of the wound at the site of the A3 pulley.  Flexor digitorum profundus tendon was repaired in zone 2 with 4-0 Supramid suture using an 8 strand modified Eric technique.    Tourniquet was let down and hemostasis was achieved with gentle pressure with warm saline soaked gauze.  The microscope was draped and brought into the field.  Tourniquet remained down for 25 minutes.  The limb was then gravity exsanguinated and tourniquet was inflated again.    In preparation for the nerve reconstructions, an AxoGen 1-2 mm x 70 mm nerve allograft was opened and placed onto  the field and thawed with normal saline.    Attention was turned to the index finger.  The radial digital nerve was exposed under the microscope and trimmed back to healthy nerve ends.  Nerve allograft was trimmed and placed in the intercalary gap.  The nerve graft was sutured to the proximal and distal ends of the radial digital nerve with 9 0 nylon suture under microscopy.  Fibrin glue was used to seal the repair sites.    The ulnar digital nerve was exposed under the microscope and trimmed back to healthy nerve ends.  Nerve allograft was trimmed and placed in the intercalary gap.  The nerve graft was sutured to the proximal and distal ends of the radial digital nerve with 9 0 nylon suture under microscopy.  Fibrin glue was used to seal the repair sites.    Attention was turned to the middle finger.  The radial digital nerve was exposed under the microscope and trimmed back to healthy nerve ends.  Nerve allograft was trimmed and placed in the intercalary gap.  The nerve graft was sutured to the proximal and distal ends of the radial digital nerve with 9 0 nylon suture under microscopy.  Fibrin glue was used to seal the repair sites.    The ulnar digital nerve was exposed under the microscope and trimmed back to healthy nerve ends.  Nerve allograft was trimmed and placed in the intercalary gap.  The nerve graft was sutured to the proximal and distal ends of the radial digital nerve with 9 0 nylon suture under microscopy.  Fibrin glue was used to seal the repair sites.    Attention was then turned to the ring finger.  The radial digital nerve was exposed under the microscope and trimmed back to healthy nerve ends.  There was still significant slack on this nerve after mobilization and the nerve ends were approximated directly.  A direct repair was performed with 9 0 nylon suture under microscopy.  Fibrin glue was used to seal the repair site.    Attention was turned to the little finger. The radial digital nerve was  exposed under the microscope and trimmed back to healthy nerve ends.  There was still significant slack on this nerve after mobilization and the nerve ends were approximated directly.  A direct repair was performed with 9 0 nylon suture under microscopy.  Fibrin glue was used to seal the repair site.    The ulnar digital nerve was exposed under the microscope and trimmed back to healthy nerve ends.  Nerve allograft was trimmed and placed in the intercalary gap.  The nerve graft was sutured to the proximal and distal ends of the radial digital nerve with 9 0 nylon suture under microscopy.  Fibrin glue was used to seal the repair sites.    Tourniquet was let down and hemostasis was achieved.  The index, ring, small finger were well perfused.  The middle finger was also perfused although capillary refill was slightly more sluggish compared to the rest.  This was likely due to both digital arteries being transected.  The decision was made to repair the radial digital artery in the middle finger.  The radial digital artery was exposed and the arterial ends were freshened under the microscope.  A dilator was used to dilate the vessel.  Heparinized saline was used to irrigate the vessel.  Papaverine was injected at the vessel ends to promote vasodilation.  A double bulldog vessel clamp was used to approximate the vessel ends.  The vessel was directly repaired using 9 0 nylon suture.  The bulldog clamps were removed and there was great flow across the repair site.  The middle finger was perfused with brisk capillary refill.    All wounds were copiously irrigated.  All wounds were closed with 4-0 nylon suture and 5 0 plain gut suture.  2% lidocaine was injected at the base of all 4 fingers to promote vasodilation.  A sterile dressing was applied with a forearm based dorsal blocking full finger splint.     Patient was extubated without complications and transferred to the recovery room in stable condition.        Post-operative Plan:  The patient will start therapy on postoperative day 3.  He will get a custom-made splint as well as start a supervised early active range of motion protocol.

## 2023-01-21 NOTE — DISCHARGE SUMMARY
[facility] Per[facility] Services  Discharge Note  Short Stay    Procedure(s) (LRB):  REPAIR, TENDON, FLEXOR  Two tendons each in Four fingers. MAC and supraclav (Right)  REPAIR, NERVE USING ALLOGRAFT  Both nerves in 4 fingers (Right)      OUTCOME: Patient tolerated treatment/procedure well without complication and is now ready for discharge.    DISPOSITION: Home or Self Care    FINAL DIAGNOSIS:  <principal problem not specified>    FOLLOWUP: In clinic    DISCHARGE INSTRUCTIONS:    Discharge Procedure Orders   SLING ORTHOPEDIC MEDIUM FOR HOME USE     Diet general     Activity as tolerated     Keep surgical extremity elevated     Ice to affected area     Lifting restrictions   Order Comments: No lifting, pushing, pulling with operative extremity     No driving, operating heavy equipment or signing legal documents while taking pain medication.     Other restrictions (specify):   Order Comments: Okay to wean sling as tolerated.     Leave dressing on - Keep it clean, dry, and intact until clinic visit     Call MD for:  temperature >100.4     Call MD for:  persistent nausea and vomiting     Call MD for:  severe uncontrolled pain     Call MD for:  difficulty breathing, headache or visual disturbances     Call MD for:  redness, tenderness, or signs of infection (pain, swelling, redness, odor or green/yellow discharge around incision site)     Call MD for:  hives     Call MD for:  persistent dizziness or light-headedness     Call MD for:  extreme fatigue     Shower on day dressing removed (No bath)        TIME SPENT ON DISCHARGE: 15 minutes

## 2023-01-21 NOTE — TRANSFER OF CARE
"Anesthesia Transfer of Care Note    Patient: Lul Porter    Procedure(s) Performed: Procedure(s) (LRB):  REPAIR, TENDON, FLEXOR  Two tendons each in Four fingers. MAC and supraclav (Right)  REPAIR, NERVE USING ALLOGRAFT  Both nerves in 4 fingers (Right)    Patient location: PACU    Anesthesia Type: general    Transport from OR: Transported from OR on room air with adequate spontaneous ventilation    Post pain: adequate analgesia    Post assessment: no apparent anesthetic complications    Post vital signs: stable    Level of consciousness: awake and sedated    Nausea/Vomiting: no nausea/vomiting    Complications: none    Transfer of care protocol was followed      Last vitals:   Visit Vitals  /74   Pulse 78   Temp 36.7 °C (98.1 °F)   Resp 16   Ht 5' 8" (1.727 m)   Wt 87.5 kg (192 lb 14.4 oz)   SpO2 97%   BMI 29.33 kg/m²     "

## 2023-01-23 VITALS
DIASTOLIC BLOOD PRESSURE: 86 MMHG | HEIGHT: 68 IN | RESPIRATION RATE: 20 BRPM | SYSTOLIC BLOOD PRESSURE: 129 MMHG | TEMPERATURE: 97 F | HEART RATE: 100 BPM | OXYGEN SATURATION: 92 % | BODY MASS INDEX: 29.23 KG/M2 | WEIGHT: 192.88 LBS

## 2023-01-23 DIAGNOSIS — S61.401A FLEXOR TENDON LACERATION OF RIGHT HAND WITH OPEN WOUND, INITIAL ENCOUNTER: Primary | ICD-10-CM

## 2023-01-23 DIAGNOSIS — S66.122A LACERATION OF FLEXOR MUSCLE, FASCIA AND TENDON OF RIGHT MIDDLE FINGER AT WRIST AND HAND LEVEL, INITIAL ENCOUNTER: ICD-10-CM

## 2023-01-23 DIAGNOSIS — S66.821A FLEXOR TENDON LACERATION OF RIGHT HAND WITH OPEN WOUND, INITIAL ENCOUNTER: Primary | ICD-10-CM

## 2023-01-23 DIAGNOSIS — S66.126A LACERATION OF FLEXOR MUSCLE, FASCIA AND TENDON OF RIGHT LITTLE FINGER AT WRIST AND HAND LEVEL, INITIAL ENCOUNTER: ICD-10-CM

## 2023-01-24 DIAGNOSIS — S61.401A FLEXOR TENDON LACERATION OF RIGHT HAND WITH OPEN WOUND, INITIAL ENCOUNTER: Primary | ICD-10-CM

## 2023-01-24 DIAGNOSIS — S66.821A FLEXOR TENDON LACERATION OF RIGHT HAND WITH OPEN WOUND, INITIAL ENCOUNTER: Primary | ICD-10-CM

## 2023-01-24 NOTE — TELEPHONE ENCOUNTER
3:31pm- patient called asking for refills and a 10's unit.    03:51pm- I called patient to let him know that O was send dr. Adame this and that once I got confirmation I will let him know. Patient said okay.

## 2023-01-25 ENCOUNTER — TELEPHONE (OUTPATIENT)
Dept: ORTHOPEDICS | Facility: CLINIC | Age: 28
End: 2023-01-25
Payer: MEDICAID

## 2023-01-25 RX ORDER — OXYCODONE AND ACETAMINOPHEN 10; 325 MG/1; MG/1
1 TABLET ORAL EVERY 4 HOURS PRN
Qty: 28 TABLET | Refills: 0 | Status: SHIPPED | OUTPATIENT
Start: 2023-01-25 | End: 2023-02-15 | Stop reason: SDUPTHER

## 2023-01-25 NOTE — TELEPHONE ENCOUNTER
I called patient back after speaking to Sabra. I explained that we have requested the PA from insurance and currently waiting for them to send it to us. I explained to him that it may take a few days to process. Patient voiced a clear understanding.

## 2023-01-25 NOTE — TELEPHONE ENCOUNTER
I called the patients pharmacy to make sure that it was a prior auth that was actually needed. Pharmacy staff states that they do not need a prior auth. States that they have a different insurance on file for him at the pharmacy. States that he needs to situate his insurance in order for it to process. Patients family member told pharmacy that they recently changed his insurance without him knowing.     I called patient and explained this to him. Patient states that he will get the insurance issue situated and let us know if there is anything else to be done on our end.

## 2023-01-25 NOTE — TELEPHONE ENCOUNTER
Called patient and informed him that medication has been sent to his pharmacy. Patient states that medicaid and his pharmacy stated that he needs a prior auth for his medications. I explained to the patient that I would look into it to see what needs to be done. Explained that I will give him a call back this afternoon.   Patient voiced a clear understanding.

## 2023-01-27 NOTE — TELEPHONE ENCOUNTER
Patient states that his medication still is not approved. I informed the patient that it shows no authorization required on our end but pharmacy is saying it is not approved. I did start a new PA to see if it would get approved. They did offer him to do cash pay. Patient states that he had to pay for medication last time and he can not pay for this one. States he will wait to see if it gets approved.

## 2023-01-31 ENCOUNTER — TELEPHONE (OUTPATIENT)
Dept: ORTHOPEDICS | Facility: CLINIC | Age: 28
End: 2023-01-31
Payer: MEDICAID

## 2023-01-31 NOTE — TELEPHONE ENCOUNTER
10:34am- Patient called stating that he wanted to make sure that he didn't need a prior authorization to fill the script that was sent last week to him    10:42am- I called the patient and let him know that the medication was on hold at the pharmacy, just to call them so they can fill it. I also let him know to bring in his updated insurance card because when we called the pharmacy last week they had 2 different insurances on file. Patient stated that he would do this. I told him to call if he had anymore questions. Patient agreed.

## 2023-02-01 ENCOUNTER — TELEPHONE (OUTPATIENT)
Dept: ORTHOPEDICS | Facility: CLINIC | Age: 28
End: 2023-02-01
Payer: MEDICAID

## 2023-02-02 NOTE — TELEPHONE ENCOUNTER
Patient called asking about the prior authorization for his medication. I explained to the patient that we have sent everything to the insurance company so now we wait for them to complete there part. I instructed him to check with his pharmacy periodically because once approved it will go to them. Patient states he will also contact medicaid. Patient voiced a clear understanding.

## 2023-02-06 NOTE — TELEPHONE ENCOUNTER
I spoke to Sabra and switched the authorization to the correct insurance because pharmacy had sent it to us with a different insurance. I called patient and explained to him that I spoke to Sabra who handles our prior auths and she has switched it to the correct insurance. I called patient and explained to him and instructed him to check with his pharmacy. Patient voiced a clear understanding.

## 2023-02-07 NOTE — TELEPHONE ENCOUNTER
Patient called and left a message asking for a call back about his authorization.     02/07/23 @ 10:56 AM   I called the patient back. Patient states that he called his insurance this morning and they state that they have not received anything from us. I explained that I do see documentation in the chart that it was sent to the correct insurance company ([facility] [employer]).   Patient states that he was told by the insurance company to ask if we can make it urgent because if not it can take up to 5 days and he is completely out of medication. He was also given a phone number and fax number the insurance company.   Phone #: [phone]  Fax #: [phone]    I explained to the patient that I would forward this information to Alyssa our authorizations nurse. Patient voiced a clear understanding.

## 2023-02-07 NOTE — TELEPHONE ENCOUNTER
02/07/23 @ 1:15 PM -- I called patient and explained to him that Sabra has faxed the forms since it did not come in epic. I explained that at this point it is a waiting game with the insurance. Patient voiced a clear understanding and stated that he will continue to call his insurance company.

## 2023-02-08 ENCOUNTER — OFFICE VISIT (OUTPATIENT)
Dept: ORTHOPEDICS | Facility: CLINIC | Age: 28
End: 2023-02-08
Payer: MEDICAID

## 2023-02-08 VITALS — HEIGHT: 68 IN | WEIGHT: 192 LBS | BODY MASS INDEX: 29.1 KG/M2

## 2023-02-08 DIAGNOSIS — S66.120A LACERATION OF FLEXOR MUSCLE, FASCIA AND TENDON OF RIGHT INDEX FINGER AT WRIST AND HAND LEVEL, INITIAL ENCOUNTER: Primary | ICD-10-CM

## 2023-02-08 PROCEDURE — 3008F PR BODY MASS INDEX (BMI) DOCUMENTED: ICD-10-PCS | Mod: CPTII,,, | Performed by: STUDENT IN AN ORGANIZED HEALTH CARE EDUCATION/TRAINING PROGRAM

## 2023-02-08 PROCEDURE — 3008F BODY MASS INDEX DOCD: CPT | Mod: CPTII,,, | Performed by: STUDENT IN AN ORGANIZED HEALTH CARE EDUCATION/TRAINING PROGRAM

## 2023-02-08 PROCEDURE — 1159F MED LIST DOCD IN RCRD: CPT | Mod: CPTII,,, | Performed by: STUDENT IN AN ORGANIZED HEALTH CARE EDUCATION/TRAINING PROGRAM

## 2023-02-08 PROCEDURE — 99024 PR POST-OP FOLLOW-UP VISIT: ICD-10-PCS | Mod: ,,, | Performed by: STUDENT IN AN ORGANIZED HEALTH CARE EDUCATION/TRAINING PROGRAM

## 2023-02-08 PROCEDURE — 1159F PR MEDICATION LIST DOCUMENTED IN MEDICAL RECORD: ICD-10-PCS | Mod: CPTII,,, | Performed by: STUDENT IN AN ORGANIZED HEALTH CARE EDUCATION/TRAINING PROGRAM

## 2023-02-08 PROCEDURE — 99024 POSTOP FOLLOW-UP VISIT: CPT | Mod: ,,, | Performed by: STUDENT IN AN ORGANIZED HEALTH CARE EDUCATION/TRAINING PROGRAM

## 2023-02-08 NOTE — PROGRESS NOTES
"Postop Clinic Note    Surgery:  Right index, middle, ring, small flexor tendon repairs in zone 2, nerve repairs, volar plate repairs    History of present illness:    Patient is doing well.  He has some pain but it is controlled.  He is doing therapy.  Therapist is here with him in clinic today.  He states he occasionally feels shock-like sensations radiating into the fingertips.      Past Surgical History:   Procedure Laterality Date    FLEXOR TENDON REPAIR Right 1/20/2023    Procedure: REPAIR, TENDON, FLEXOR  Two tendons each in Four fingers. MAC and supraclav;  Surgeon: Artur Adame MD;  Location: [facility] OR;  Service: Orthopedics;  Laterality: Right;  Two tendons each in Four fingers. MAC and supraclav    MYRINGOTOMY W/ TUBES Bilateral     childhood years    REPAIR, NERVE USING ALLOGRAFT Right 1/20/2023    Procedure: REPAIR, NERVE USING ALLOGRAFT  Both nerves in 4 fingers;  Surgeon: Artur Adame MD;  Location: [facility] OR;  Service: Orthopedics;  Laterality: Right;  Both nerves in 4 fingers           Examination:    Vital Signs:    Vitals:    02/08/23 0833   Weight: 87.1 kg (192 lb)   Height: 5' 8" (1.727 m)   PainSc:   8       Body mass index is 29.19 kg/m².    Constitution:   Well-developed, well nourished patient in no acute distress.  Neurological:   Alert and oriented x 3 and cooperative to examination.     Psychiatric/Behavior: Normal mental status.  Respiratory:   No shortness of breath.  Eyes:    Extraoccular muscles intact  Skin:    No scars, rash or suspicious lesions.    MSK:     Surgical incisions over the hand are healing well with sutures in place no evidence of infection.  Fingers are in a good resting posture              Imaging:   No new imaging     Assessment:  Status post above surgery    Plan:  His wounds look good.  I will take sutures out today.  I would like him to continue working with therapy using a guided flexor tendon repair protocol.  I would like him to be aggressive with motion " because I would like for him to return to work.  This will be done under the supervision of a hand therapist.    Follow Up:  6 weeks  Xray at next visit:  None

## 2023-02-09 ENCOUNTER — TELEPHONE (OUTPATIENT)
Dept: ORTHOPEDICS | Facility: CLINIC | Age: 28
End: 2023-02-09
Payer: MEDICAID

## 2023-02-09 NOTE — TELEPHONE ENCOUNTER
02/09/23 @ 10:00AM - I called the patient and informed him that the order for the tens unit has been sent to [facility] so he can speak to them about it at his next therapy session. Patient states that he is going today and will talk about it then. Patient voiced a clear understanding.

## 2023-02-13 ENCOUNTER — PATIENT MESSAGE (OUTPATIENT)
Dept: ORTHOPEDICS | Facility: CLINIC | Age: 28
End: 2023-02-13
Payer: MEDICAID

## 2023-02-13 ENCOUNTER — TELEPHONE (OUTPATIENT)
Dept: ORTHOPEDICS | Facility: CLINIC | Age: 28
End: 2023-02-13
Payer: MEDICAID

## 2023-02-13 NOTE — TELEPHONE ENCOUNTER
Per Dr. Adame I called the patient back and explained that he reviewed images and it looks fine. Patient voiced a clear understanding.

## 2023-02-15 RX ORDER — OXYCODONE AND ACETAMINOPHEN 10; 325 MG/1; MG/1
1 TABLET ORAL EVERY 4 HOURS PRN
Qty: 28 TABLET | Refills: 0 | Status: SHIPPED | OUTPATIENT
Start: 2023-02-15 | End: 2023-02-15 | Stop reason: SDUPTHER

## 2023-02-15 RX ORDER — OXYCODONE AND ACETAMINOPHEN 10; 325 MG/1; MG/1
1 TABLET ORAL EVERY 4 HOURS PRN
Qty: 28 TABLET | Refills: 0 | Status: SHIPPED | OUTPATIENT
Start: 2023-02-15 | End: 2023-02-21 | Stop reason: SDUPTHER

## 2023-02-15 NOTE — TELEPHONE ENCOUNTER
Patient stated that he needs this med sent to the pharmacy closer to him in [place]. I re- routed the med to Dr. Adame.

## 2023-02-16 NOTE — TELEPHONE ENCOUNTER
I spoke to the patient and he states he was able to get his medications from the pharmacy with the previous authorization.

## 2023-02-16 NOTE — TELEPHONE ENCOUNTER
Patient called and left a message requesting a call back about the prior authorization for his medication.     I called the patient back and explained to him that his last prior authorization does not  till 3/7/23. I explained that I will talk to our authorizations nurse and get back to him.

## 2023-02-22 RX ORDER — OXYCODONE AND ACETAMINOPHEN 10; 325 MG/1; MG/1
1 TABLET ORAL EVERY 4 HOURS PRN
Qty: 28 TABLET | Refills: 0 | Status: SHIPPED | OUTPATIENT
Start: 2023-02-22 | End: 2023-03-01 | Stop reason: SDUPTHER

## 2023-02-22 NOTE — TELEPHONE ENCOUNTER
I called patient and informed him that Dr. Adame sent the medication to the pharmacy. I explained that the authorization for the medication does not  till 3/7/23 so hopefully the pharmacy does not have any problems this time. I instructed him to call with any issues. Patient voiced a clear understanding.

## 2023-02-24 ENCOUNTER — TELEPHONE (OUTPATIENT)
Dept: ORTHOPEDICS | Facility: CLINIC | Age: 28
End: 2023-02-24
Payer: MEDICAID

## 2023-02-24 NOTE — TELEPHONE ENCOUNTER
We received a fax from [facility] stating that insurance was not covering the Oxycodone but gave a preferred alternative. Dr. Adame agreed to the alternative. I called pharmacy to approve alternative. Pharmacy staff states that we can disregard they were able to get insurance to cover the original prescription and patient has picked up the medication.

## 2023-03-02 RX ORDER — OXYCODONE AND ACETAMINOPHEN 10; 325 MG/1; MG/1
1 TABLET ORAL EVERY 4 HOURS PRN
Qty: 28 TABLET | Refills: 0 | Status: SHIPPED | OUTPATIENT
Start: 2023-03-02 | End: 2023-03-08 | Stop reason: SDUPTHER

## 2023-03-02 NOTE — TELEPHONE ENCOUNTER
I called the patient and informed him that Dr. Adame has sent the medication to his pharmacy so he can check with them this afternoon. I explained that his authorization is still good for this Rx as well. Patient states that insurance told him that he has filling this medication to many times. States that pharmacy is going to run the medication again tomorrow to see if they approve it then. I explained that he can call our clinic with any other problems/questions. Patient voiced a clear understanding.

## 2023-03-08 RX ORDER — OXYCODONE AND ACETAMINOPHEN 10; 325 MG/1; MG/1
1 TABLET ORAL EVERY 4 HOURS PRN
Qty: 28 TABLET | Refills: 0 | Status: SHIPPED | OUTPATIENT
Start: 2023-03-10 | End: 2023-03-20 | Stop reason: SDUPTHER

## 2023-03-08 NOTE — TELEPHONE ENCOUNTER
Patient called requesting a refill. Patient received his last prescription on 23. However, he is calling early because his authorization is now  so we will need to start a new authorization that may take a few days. I explained to the patient that I would route the request to Dr. Adame then once it is ordered we can start the new authorization. I explained that this process may take a few days. Patient voiced a clear understanding.     On the refill I put for the pharmacy not to dispense until 3/10/23 since he received the last prescription on 23.

## 2023-03-14 NOTE — TELEPHONE ENCOUNTER
I called the patient and explained to him that I submitted the authorization however we now have to wait on the insurance.

## 2023-03-14 NOTE — TELEPHONE ENCOUNTER
Pharmacy staff (Meera) called and left a message for a call back.     I called the pharmacy back and spoke to Meera who states that patient has been on this pain medication for a period of time. They questioned if this is a short term treatment or long term? I explained that the patient had surgery so this is a short term treatment for his post op pain.

## 2023-03-14 NOTE — TELEPHONE ENCOUNTER
Patient called to inform us that he was out of pain medication completely so he paid cash instead of waiting for the authorization. I explained to him that it will still process the authorization and will be on file incase he is in need of a refill before the authorization expires.   Patient voiced a clear understanding.    Pt presents to ED with CC of headaches intermittent for 3 days. Pt reports \"tightness\" in my face. Sensation equal on each side of face, strength equal bilaterally. Pt reports headaches intermittent since October when she gave birth. +dizziness  Pt last took tylenol at 1530 \"which helped quite a bit\".   Hx of panic attacks, last one per pt was last night.   Denies chest pain, blurry vision, SOB.

## 2023-03-20 DIAGNOSIS — S66.120A LACERATION OF FLEXOR MUSCLE, FASCIA AND TENDON OF RIGHT INDEX FINGER AT WRIST AND HAND LEVEL, INITIAL ENCOUNTER: Primary | ICD-10-CM

## 2023-03-20 RX ORDER — OXYCODONE AND ACETAMINOPHEN 10; 325 MG/1; MG/1
1 TABLET ORAL EVERY 4 HOURS PRN
Qty: 28 TABLET | Refills: 0 | Status: SHIPPED | OUTPATIENT
Start: 2023-03-20 | End: 2023-03-30

## 2023-03-20 NOTE — TELEPHONE ENCOUNTER
I called the patient and informed him that I routed the request to Dr. Adame. I explained that he can check with his pharmacy later this evening. Patient voiced a clear understanding.

## 2023-03-21 ENCOUNTER — TELEPHONE (OUTPATIENT)
Dept: ORTHOPEDICS | Facility: CLINIC | Age: 28
End: 2023-03-21
Payer: MEDICAID

## 2023-03-21 NOTE — TELEPHONE ENCOUNTER
Patient called stating that his pharmacy is having issues with his insurance covering his medications.     I called his pharmacy who states that they were able to fix it.     I informed patient.

## 2023-03-21 NOTE — TELEPHONE ENCOUNTER
I called the patient and informed him that Dr. Adame has signed his medications so she can check with her pharmacy. Patient voiced a clear understanding.      States that pharmacy told him a problem with insurance. I explained that we received authorization so they can call or check with insurance company.

## 2023-03-22 ENCOUNTER — OFFICE VISIT (OUTPATIENT)
Dept: ORTHOPEDICS | Facility: CLINIC | Age: 28
End: 2023-03-22
Payer: MEDICAID

## 2023-03-22 DIAGNOSIS — S66.120A LACERATION OF FLEXOR MUSCLE, FASCIA AND TENDON OF RIGHT INDEX FINGER AT WRIST AND HAND LEVEL, INITIAL ENCOUNTER: Primary | ICD-10-CM

## 2023-03-22 PROCEDURE — 1159F MED LIST DOCD IN RCRD: CPT | Mod: CPTII,,, | Performed by: STUDENT IN AN ORGANIZED HEALTH CARE EDUCATION/TRAINING PROGRAM

## 2023-03-22 PROCEDURE — 1159F PR MEDICATION LIST DOCUMENTED IN MEDICAL RECORD: ICD-10-PCS | Mod: CPTII,,, | Performed by: STUDENT IN AN ORGANIZED HEALTH CARE EDUCATION/TRAINING PROGRAM

## 2023-03-22 PROCEDURE — 99024 PR POST-OP FOLLOW-UP VISIT: ICD-10-PCS | Mod: ,,, | Performed by: STUDENT IN AN ORGANIZED HEALTH CARE EDUCATION/TRAINING PROGRAM

## 2023-03-22 PROCEDURE — 99024 POSTOP FOLLOW-UP VISIT: CPT | Mod: ,,, | Performed by: STUDENT IN AN ORGANIZED HEALTH CARE EDUCATION/TRAINING PROGRAM

## 2023-03-26 NOTE — PROGRESS NOTES
Postop Clinic Note    Surgery:  Right index, middle, ring, small flexor tendon repairs in zone 2, nerve repairs, volar plate repairs 1/20/23    History of present illness:    Patient is doing well.  He is doing therapy.  He is slowly gaining some sensation into the finger tips       Past Surgical History:   Procedure Laterality Date    FLEXOR TENDON REPAIR Right 1/20/2023    Procedure: REPAIR, TENDON, FLEXOR  Two tendons each in Four fingers. MAC and supraclav;  Surgeon: Artur Adame MD;  Location: [facility] OR;  Service: Orthopedics;  Laterality: Right;  Two tendons each in Four fingers. MAC and supraclav    MYRINGOTOMY W/ TUBES Bilateral     childhood years    REPAIR, NERVE USING ALLOGRAFT Right 1/20/2023    Procedure: REPAIR, NERVE USING ALLOGRAFT  Both nerves in 4 fingers;  Surgeon: Artur Adame MD;  Location: [facility] OR;  Service: Orthopedics;  Laterality: Right;  Both nerves in 4 fingers           Examination:    Vital Signs:    Vitals:    03/22/23 0817   PainSc:   7       There is no height or weight on file to calculate BMI.    Constitution:   Well-developed, well nourished patient in no acute distress.  Neurological:   Alert and oriented x 3 and cooperative to examination.     Psychiatric/Behavior: Normal mental status.  Respiratory:   No shortness of breath.  Eyes:    Extraoccular muscles intact  Skin:    No scars, rash or suspicious lesions.    MSK:     Surgical incisions over the hand are healed.  Fingers are in a good resting posture                        Imaging:   No new imaging     Assessment:  Status post above surgery    Plan:  Continue supervised flexor tendon rehab protocol. I will follow along via therapy reports. Follow up 2 months     Follow Up:  2 months   Xray at next visit:  None

## 2023-03-30 DIAGNOSIS — S66.120A LACERATION OF FLEXOR MUSCLE, FASCIA AND TENDON OF RIGHT INDEX FINGER AT WRIST AND HAND LEVEL, INITIAL ENCOUNTER: ICD-10-CM

## 2023-03-30 RX ORDER — OXYCODONE AND ACETAMINOPHEN 5; 325 MG/1; MG/1
1 TABLET ORAL EVERY 6 HOURS PRN
Qty: 28 TABLET | Refills: 0 | Status: SHIPPED | OUTPATIENT
Start: 2023-03-30 | End: 2023-04-05 | Stop reason: SDUPTHER

## 2023-03-30 RX ORDER — OXYCODONE AND ACETAMINOPHEN 10; 325 MG/1; MG/1
1 TABLET ORAL EVERY 4 HOURS PRN
Qty: 28 TABLET | Refills: 0 | OUTPATIENT
Start: 2023-03-30

## 2023-03-30 NOTE — TELEPHONE ENCOUNTER
Patient called requesting a refill. I informed him that I will route the request to Dr. Adame. I explained that it may take 24-48 hours for the new prescription to be sent to the pharmacy. I explained that I will call and inform him once it is signed and routed to the pharmacy. I confirmed the patients pharmacy. Patient voiced a clear understanding.

## 2023-03-30 NOTE — TELEPHONE ENCOUNTER
I called the patient and informed her that Dr. Adame has singed off on his refill request so he can check with his pharmacy. Patient voiced a clear understanding.

## 2023-03-30 NOTE — TELEPHONE ENCOUNTER
Patient called requesting a refill of pain medicine.  I informed him that I will route the request to Dr. Adame. I explained that it may take 24-48 hours for the new prescription to be sent to the pharmacy. I explained that I will call and inform him once Dr. Adame signs off and it is routed to the pharmacy. I confirmed his pharmacy. Patient voiced a clear understanding.

## 2023-04-05 DIAGNOSIS — S66.120A LACERATION OF FLEXOR MUSCLE, FASCIA AND TENDON OF RIGHT INDEX FINGER AT WRIST AND HAND LEVEL, INITIAL ENCOUNTER: Primary | ICD-10-CM

## 2023-04-05 RX ORDER — OXYCODONE AND ACETAMINOPHEN 5; 325 MG/1; MG/1
1 TABLET ORAL EVERY 6 HOURS PRN
Qty: 28 TABLET | Refills: 0 | Status: SHIPPED | OUTPATIENT
Start: 2023-04-05 | End: 2023-04-12 | Stop reason: SDUPTHER

## 2023-04-06 NOTE — TELEPHONE ENCOUNTER
I called the patient and informed him that Dr. Adame has singed off on his refill request so he can check with his pharmacy. Patient voiced a clear understanding.      Patient states that he received a notification about issues with insurance. States that he is going to talk to the insurance and pharmacy today and call if he needs something from us.

## 2023-04-12 DIAGNOSIS — S66.120A LACERATION OF FLEXOR MUSCLE, FASCIA AND TENDON OF RIGHT INDEX FINGER AT WRIST AND HAND LEVEL, INITIAL ENCOUNTER: ICD-10-CM

## 2023-04-12 RX ORDER — OXYCODONE AND ACETAMINOPHEN 5; 325 MG/1; MG/1
1 TABLET ORAL EVERY 6 HOURS PRN
Qty: 28 TABLET | Refills: 0 | Status: SHIPPED | OUTPATIENT
Start: 2023-04-12 | End: 2023-04-19

## 2023-04-12 NOTE — TELEPHONE ENCOUNTER
Patient called back. I explained to him what is stated by me below. Patient voiced a clear understanding.

## 2023-04-12 NOTE — TELEPHONE ENCOUNTER
I called the patient to inform him that Dr. Adame has singed off on his refill request so he can check with his pharmacy. No answer. Can not leave a VM.

## 2023-06-19 ENCOUNTER — HOSPITAL ENCOUNTER (EMERGENCY)
Facility: HOSPITAL | Age: 28
Discharge: HOME | End: 2023-06-19
Attending: STUDENT IN AN ORGANIZED HEALTH CARE EDUCATION/TRAINING PROGRAM
Payer: MEDICAID

## 2023-06-19 VITALS
SYSTOLIC BLOOD PRESSURE: 151 MMHG | TEMPERATURE: 98 F | BODY MASS INDEX: 30.41 KG/M2 | HEART RATE: 126 BPM | OXYGEN SATURATION: 98 % | WEIGHT: 200 LBS | RESPIRATION RATE: 18 BRPM | DIASTOLIC BLOOD PRESSURE: 96 MMHG

## 2023-06-19 DIAGNOSIS — L02.91 ABSCESS: Primary | ICD-10-CM

## 2023-06-19 DIAGNOSIS — L02.31 ABSCESS OF BUTTOCK, RIGHT: ICD-10-CM

## 2023-06-19 PROCEDURE — 10140 I&D HMTMA SEROMA/FLUID COLLJ: CPT

## 2023-06-19 PROCEDURE — 63600175 PHARM REV CODE 636 W HCPCS: Performed by: STUDENT IN AN ORGANIZED HEALTH CARE EDUCATION/TRAINING PROGRAM

## 2023-06-19 PROCEDURE — 96372 THER/PROPH/DIAG INJ SC/IM: CPT | Mod: 59 | Performed by: STUDENT IN AN ORGANIZED HEALTH CARE EDUCATION/TRAINING PROGRAM

## 2023-06-19 PROCEDURE — 99284 EMERGENCY DEPT VISIT MOD MDM: CPT | Mod: 25

## 2023-06-19 RX ORDER — OXYCODONE AND ACETAMINOPHEN 10; 325 MG/1; MG/1
1 TABLET ORAL EVERY 8 HOURS PRN
Qty: 15 TABLET | Refills: 0 | Status: SHIPPED | OUTPATIENT
Start: 2023-06-19 | End: 2023-06-24

## 2023-06-19 RX ORDER — SULFAMETHOXAZOLE AND TRIMETHOPRIM 800; 160 MG/1; MG/1
1 TABLET ORAL 2 TIMES DAILY
Qty: 20 TABLET | Refills: 0 | Status: SHIPPED | OUTPATIENT
Start: 2023-06-19 | End: 2023-06-29

## 2023-06-19 RX ORDER — MORPHINE SULFATE 4 MG/ML
4 INJECTION, SOLUTION INTRAMUSCULAR; INTRAVENOUS ONCE
Status: DISCONTINUED | OUTPATIENT
Start: 2023-06-19 | End: 2023-06-19

## 2023-06-19 RX ORDER — MORPHINE SULFATE 4 MG/ML
4 INJECTION, SOLUTION INTRAMUSCULAR; INTRAVENOUS ONCE
Status: COMPLETED | OUTPATIENT
Start: 2023-06-19 | End: 2023-06-19

## 2023-06-19 RX ORDER — ONDANSETRON 2 MG/ML
4 INJECTION INTRAMUSCULAR; INTRAVENOUS ONCE
Status: COMPLETED | OUTPATIENT
Start: 2023-06-19 | End: 2023-06-19

## 2023-06-19 RX ORDER — ONDANSETRON 2 MG/ML
4 INJECTION INTRAMUSCULAR; INTRAVENOUS ONCE
Status: DISCONTINUED | OUTPATIENT
Start: 2023-06-19 | End: 2023-06-19

## 2023-06-19 RX ADMIN — MORPHINE SULFATE 4 MG: 4 INJECTION INTRAVENOUS at 10:06

## 2023-06-19 RX ADMIN — ONDANSETRON 4 MG: 2 INJECTION INTRAMUSCULAR; INTRAVENOUS at 10:06

## 2023-06-19 NOTE — ED PROVIDER NOTES
Encounter Date: 6/19/2023    SCRIBE #1 NOTE: I, Estephanie Scott, am scribing for, and in the presence of,  Florentino Santamaria MD. I have scribed the following portions of the note - Other sections scribed: HPI, ROS, PE.     History     Chief Complaint   Patient presents with    Abscess     Pt presents with boil a gluteal cleft. Onset onset x 1 week. Denies drainage. Denies fever. Painful to sit.      28 year old male presents to the ED complaining of a boil on his buttocks.  Pt says that he fell and landed on his tailbone 9 days ago while fishing.  He said when the pain began he thought it might be from the fall.  Pt denies any fever or drainage from the area.    The history is provided by the patient. No  was used.   Abscess   This is a new problem. Illness onset: one week. The problem has been gradually worsening. The abscess is present on the right buttock. The pain is at a severity of 10/10. The abscess is characterized by painfulness and redness. Pertinent negatives include no fever and no sore throat. There were no sick contacts.       Review of patient's allergies indicates:  No Known Allergies  Past Medical History:   Diagnosis Date    Mild intermittent asthma, uncomplicated      Past Surgical History:   Procedure Laterality Date    FLEXOR TENDON REPAIR Right 1/20/2023    Procedure: REPAIR, TENDON, FLEXOR  Two tendons each in Four fingers. MAC and supraclav;  Surgeon: Artur Adame MD;  Location: [facility];  Service: Orthopedics;  Laterality: Right;  Two tendons each in Four fingers. MAC and supraclav    MYRINGOTOMY W/ TUBES Bilateral     childhood years    REPAIR, NERVE USING ALLOGRAFT Right 1/20/2023    Procedure: REPAIR, NERVE USING ALLOGRAFT  Both nerves in 4 fingers;  Surgeon: Artur Adame MD;  Location: [facility] OR;  Service: Orthopedics;  Laterality: Right;  Both nerves in 4 fingers     Family History   Problem Relation Age of Onset    Throat cancer Mother     Heart attack Father       Social History     Tobacco Use    Smoking status: Every Day     Packs/day: 1.00     Types: Cigarettes    Smokeless tobacco: Current   Substance Use Topics    Alcohol use: Never    Drug use: Never     Review of Systems   Constitutional:  Negative for fever.   HENT:  Negative for sore throat.    Eyes:  Negative for visual disturbance.   Respiratory:  Negative for shortness of breath.    Cardiovascular:  Negative for chest pain.   Gastrointestinal:  Negative for abdominal pain.   Genitourinary:  Negative for dysuria.   Musculoskeletal:  Negative for joint swelling.   Skin:  Positive for wound. Negative for rash.   Neurological:  Negative for weakness.   Psychiatric/Behavioral:  Negative for confusion.    All other systems reviewed and are negative.    Physical Exam     Initial Vitals [06/19/23 0828]   BP Pulse Resp Temp SpO2   (!) 151/96 (!) 126 18 98.4 °F (36.9 °C) 98 %      MAP       --         Physical Exam    Nursing note and vitals reviewed.  Constitutional: He appears well-developed and well-nourished. He is not diaphoretic. No distress.   HENT:   Head: Normocephalic and atraumatic.   Eyes: Conjunctivae and EOM are normal. Pupils are equal, round, and reactive to light.   Neck:   Normal range of motion.  Cardiovascular:  Normal rate, regular rhythm, normal heart sounds and intact distal pulses.           No murmur heard.  Pulmonary/Chest: Breath sounds normal. No respiratory distress. He has no wheezes. He has no rales.   Abdominal: Abdomen is soft. He exhibits no distension. There is no abdominal tenderness.   Genitourinary:    Genitourinary Comments: 5 x 4 cm area of erythema to right medial gluteal cleft with induration.  No fluctuance.  Chaperone present.      Musculoskeletal:         General: No tenderness or edema.      Cervical back: Normal range of motion.      Comments: Previous injury to right hand, healed appropriately mild decrease in flexion of the 2nd digit     Neurological: He is alert and  "oriented to person, place, and time. No cranial nerve deficit.   Skin: Skin is warm and dry. Capillary refill takes less than 2 seconds. No rash noted. No erythema.   5 x 4 cm area of erythema to right medial gluteal cleft with induration.  No fluctuance.   Psychiatric: He has a normal mood and affect.       ED Course   I & D - Incision and Drainage    Date/Time: 6/19/2023 11:48 AM  Location procedure was performed: [facility] EMERGENCY DEPARTMENT  Performed by: Florentino Santamaria MD  Authorized by: Florentino Santamaria MD   Consent Done: Yes  Risks and benefits: risks, benefits and alternatives were discussed  Consent given by: patient  Patient understanding: patient states understanding of the procedure being performed  Patient consent: the patient's understanding of the procedure matches consent given  Procedure consent: procedure consent matches procedure scheduled  Relevant documents: relevant documents present and verified  Test results: test results available and properly labeled  Site marked: the operative site was marked  Imaging studies: imaging studies available  Patient identity confirmed: name  Time out: Immediately prior to procedure a "time out" was called to verify the correct patient, procedure, equipment, support staff and site/side marked as required.  Type: abscess  Body area: anogenital (buttocks)    Anesthesia:  Local Anesthetic: lidocaine 1% without epinephrine    Patient sedated: no  Scalpel size: 11  Incision type: single straight  Complexity: simple  Drainage: pus  Drainage amount: copious (Copious amount of purulent drainage.)  Wound treatment: incision, drainage, wound packed and deloculation  Packing material: 1/4 in gauze  Complications: No  Patient tolerance: Patient tolerated the procedure well with no immediate complications      Labs Reviewed - No data to display       Imaging Results    None          Medications   morphine injection 4 mg (4 mg Intramuscular Given 6/19/23 1041)   ondansetron " injection 4 mg (4 mg Intramuscular Given 6/19/23 1049)     Medical Decision Making:   History:   I obtained history from: someone other than patient.       <> Summary of History: Family at bedside, reports concern for boil  Old Medical Records: I decided to obtain old medical records.  Old Records Summarized: records from clinic visits and records from previous admission(s).       <> Summary of Records: History of hand laceration in the past to right hand  Initial Assessment:   Abscess  Differential Diagnosis:   Judging by the patient's chief complaint and pertinent history, the patient has the following possible differential diagnoses, including but not limited to the following.  Some of these are deemed to be lower likelihood and some more likely based on my physical exam and history combined with possible lab work and/or imaging studies.   Please see the pertinent studies, and refer to the HPI.  Some of these diagnoses will take further evaluation to fully rule out, perhaps as an outpatient and the patient was encouraged to follow up when discharged for more comprehensive evaluation.    abscess, cellulitis, perirectal abscess, rectal abscess  ED Management:    Patient is a 28-year-old male who presents to the emergency department for abscess to the right buttocks.  See HPI.  No signs of fever or any other signs of systemic infection at this point.  See physical exam.  Abscess to the right buttock, Does not appear to involve rectum.  Large area of erythema.  Bedside ultrasound confirmed good degree of fluid collection.  I&D performed with copious amount of purulent material expressed.  Probe deloculated, irrigated, packed.  Will start patient on course of Bactrim.  Discussed all results.  Discussed need for follow-up with primary care physician.  Discussed return precautions including to return if any fever, worsening redness, pain, or any other symptoms.  Hemodynamically stable for continued outpatient management  strict return precautions.  Patient and family verbalized understanding agreed to plan.        Scribe Attestation:   Scribe #1: I performed the above scribed service and the documentation accurately describes the services I performed. I attest to the accuracy of the note.    Attending Attestation:           Physician Attestation for Scribe:  Physician Attestation Statement for Scribe #1: I, Florentino Santamaria MD, reviewed documentation, as scribed by Estephanie Scott in my presence, and it is both accurate and complete.       Medical Decision Making  Problems Addressed:  Abscess: acute illness or injury that poses a threat to life or bodily functions  Abscess of buttock, right: acute illness or injury that poses a threat to life or bodily functions    Risk  Prescription drug management.  Parenteral controlled substances.                        Clinical Impression:   Final diagnoses:  [L02.91] Abscess (Primary)  [L02.31] Abscess of buttock, right        ED Disposition Condition    Discharge Stable          ED Prescriptions       Medication Sig Dispense Start Date End Date Auth. Provider    oxyCODONE-acetaminophen (PERCOCET)  mg per tablet Take 1 tablet by mouth every 8 (eight) hours as needed for Pain. 15 tablet 6/19/2023 6/24/2023 Florentino Santamaria MD    sulfamethoxazole-trimethoprim 800-160mg (BACTRIM DS) 800-160 mg Tab Take 1 tablet by mouth 2 (two) times daily. for 10 days 20 tablet 6/19/2023 6/29/2023 Florentino Santamaria MD          Follow-up Information       Follow up With Specialties Details Why Contact Info    Your primary care physician.                 Florentino Santamaria MD  06/19/23 3610

## 2023-06-19 NOTE — DISCHARGE INSTRUCTIONS
Follow-up with your primary care physician.      You have packing in place which will fall out on its own or need removal in approximately 3 days.      Take antibiotic as prescribed.      Return to the emergency department if you have any worsening or new symptoms.  Return if any fever, worsening pain and swelling, nausea, vomiting, difficulty breathing, or any other symptoms.

## 2023-07-03 ENCOUNTER — OFFICE VISIT (OUTPATIENT)
Dept: ORTHOPEDICS | Facility: CLINIC | Age: 28
End: 2023-07-03
Payer: MEDICAID

## 2023-07-03 VITALS — BODY MASS INDEX: 30.31 KG/M2 | WEIGHT: 200 LBS | HEIGHT: 68 IN

## 2023-07-03 DIAGNOSIS — S66.120A LACERATION OF FLEXOR MUSCLE, FASCIA AND TENDON OF RIGHT INDEX FINGER AT WRIST AND HAND LEVEL, INITIAL ENCOUNTER: Primary | ICD-10-CM

## 2023-07-03 PROCEDURE — 99213 OFFICE O/P EST LOW 20 MIN: CPT | Mod: ,,, | Performed by: STUDENT IN AN ORGANIZED HEALTH CARE EDUCATION/TRAINING PROGRAM

## 2023-07-03 PROCEDURE — 99213 PR OFFICE/OUTPT VISIT, EST, LEVL III, 20-29 MIN: ICD-10-PCS | Mod: ,,, | Performed by: STUDENT IN AN ORGANIZED HEALTH CARE EDUCATION/TRAINING PROGRAM

## 2023-07-03 PROCEDURE — 3008F PR BODY MASS INDEX (BMI) DOCUMENTED: ICD-10-PCS | Mod: CPTII,,, | Performed by: STUDENT IN AN ORGANIZED HEALTH CARE EDUCATION/TRAINING PROGRAM

## 2023-07-03 PROCEDURE — 3008F BODY MASS INDEX DOCD: CPT | Mod: CPTII,,, | Performed by: STUDENT IN AN ORGANIZED HEALTH CARE EDUCATION/TRAINING PROGRAM

## 2023-07-03 PROCEDURE — 1159F MED LIST DOCD IN RCRD: CPT | Mod: CPTII,,, | Performed by: STUDENT IN AN ORGANIZED HEALTH CARE EDUCATION/TRAINING PROGRAM

## 2023-07-03 PROCEDURE — 1159F PR MEDICATION LIST DOCUMENTED IN MEDICAL RECORD: ICD-10-PCS | Mod: CPTII,,, | Performed by: STUDENT IN AN ORGANIZED HEALTH CARE EDUCATION/TRAINING PROGRAM

## 2023-07-03 NOTE — LETTER
[facility]  [address] [place] [address]. [address], [address]  Phone: [phone]  Fax: [phone]    Name:Lul Porter  :1995   Date:2023         PATIENT IS ABLE TO RETURN TO WORK AS OF: 23    [_] SEDENTARY WORK: Lifting 10 pounds maximum and occasionally lifting and/or carrying articles such as dockers, ledgers and small tools.  Although a sedentary job is defined as one which involved sitting, a certain amount of walking and standing are required only occasionally and other sedentary criteria are met.    [_] LIGHT WORK: Lifting 20 pounds with frequent lifting and/or carrying objects weighing up to 10 pounds.  Even though the weight lifted may be only a negotiable amount, a job is in the category when it involves sitting most of the time with a degree of pushing/pulling of arm and/or leg controls.    [_] MEDIUM WORK: Lifting of 50 pounds maximum with frequent lifting and/or carrying of objects up to 25 pounds.    [_] HEAVY WORK: Lifting of 100 pounds maximum with frequent lifting and/or carrying objects up to 50 pounds.    [_] VERY HEAVY WORK: Lifting objects in excess of 100 pounds with frequent lifting and/or carrying of objects weighing 50 pounds or more.    [XX] REGULAR DUTY: [XX] No Restrictions. [_] With Restrictions (See comments below0:    COMMENTS    Thank you,   Artur Adame MD/HRL

## 2023-07-03 NOTE — PROGRESS NOTES
"Postop Clinic Note    Surgery:  Right index, middle, ring, small flexor tendon repairs in zone 2, nerve repairs, volar plate repairs 1/20/23    History of present illness:    Patient is doing well.  He is doing therapy.  He is gaining significantly more sensation in the finger tips. The index finger is the stiffest and least sensate       Past Surgical History:   Procedure Laterality Date    FLEXOR TENDON REPAIR Right 1/20/2023    Procedure: REPAIR, TENDON, FLEXOR  Two tendons each in Four fingers. MAC and supraclav;  Surgeon: Artur Adame MD;  Location: [facility];  Service: Orthopedics;  Laterality: Right;  Two tendons each in Four fingers. MAC and supraclav    MYRINGOTOMY W/ TUBES Bilateral     childhood years    REPAIR, NERVE USING ALLOGRAFT Right 1/20/2023    Procedure: REPAIR, NERVE USING ALLOGRAFT  Both nerves in 4 fingers;  Surgeon: Artur Adame MD;  Location: [facility] OR;  Service: Orthopedics;  Laterality: Right;  Both nerves in 4 fingers           Examination:    Vital Signs:    Vitals:    07/03/23 1109   Weight: 90.7 kg (200 lb)   Height: 5' 8" (1.727 m)       Body mass index is 30.41 kg/m².    Constitution:   Well-developed, well nourished patient in no acute distress.  Neurological:   Alert and oriented x 3 and cooperative to examination.     Psychiatric/Behavior: Normal mental status.  Respiratory:   No shortness of breath.  Eyes:    Extraoccular muscles intact  Skin:    No scars, rash or suspicious lesions.    MSK:     Surgical incisions over the hand are healed.  Fingers are in a good resting posture. He can now flex to 2cm away from DPC.                         Imaging:   No new imaging     Assessment:  Status post above surgery    Plan:  He is doing well. He can now ease into activity as tolerated. He does not have a weight restriction from me since at 6 months I think his repairs are healed. He should be careful on the type of work he is doing since he still does not have full flexion and full " sensation. He can continue working on ROM exercises and I think he will continue to improve with regaining flexion.     Follow Up:  6 months   Xray at next visit:  None

## 2024-04-08 ENCOUNTER — HOSPITAL ENCOUNTER (EMERGENCY)
Facility: HOSPITAL | Age: 29
Discharge: LEFT BEFORE DISCHARGE | End: 2024-04-08
Payer: MEDICAID

## 2024-04-08 VITALS
WEIGHT: 200 LBS | SYSTOLIC BLOOD PRESSURE: 149 MMHG | OXYGEN SATURATION: 97 % | HEART RATE: 94 BPM | BODY MASS INDEX: 30.41 KG/M2 | RESPIRATION RATE: 18 BRPM | DIASTOLIC BLOOD PRESSURE: 88 MMHG | TEMPERATURE: 99 F

## 2024-04-08 PROCEDURE — 99281 EMR DPT VST MAYX REQ PHY/QHP: CPT

## 2024-04-08 RX ORDER — PROPARACAINE HYDROCHLORIDE 5 MG/ML
1 SOLUTION/ DROPS OPHTHALMIC
Status: DISCONTINUED | OUTPATIENT
Start: 2024-04-08 | End: 2024-04-08 | Stop reason: HOSPADM

## 2024-04-08 NOTE — FIRST PROVIDER EVALUATION
Medical screening examination initiated.  I have conducted a focused provider triage encounter, findings are as follows:    Brief history of present illness:  30 y/o male who presents with right eye irritation. Unknown if something got in it.     There were no vitals filed for this visit.    Pertinent physical exam:  alert, right eye redness/irritation    Brief workup plan:  exam     Preliminary workup initiated; this workup will be continued and followed by the physician or advanced practice provider that is assigned to the patient when roomed.

## 2024-10-21 ENCOUNTER — HOSPITAL ENCOUNTER (EMERGENCY)
Facility: HOSPITAL | Age: 29
Discharge: HOME | End: 2024-10-21
Attending: EMERGENCY MEDICINE
Payer: MEDICAID

## 2024-10-21 VITALS
WEIGHT: 200 LBS | BODY MASS INDEX: 31.39 KG/M2 | SYSTOLIC BLOOD PRESSURE: 132 MMHG | OXYGEN SATURATION: 97 % | HEIGHT: 67 IN | RESPIRATION RATE: 16 BRPM | HEART RATE: 79 BPM | TEMPERATURE: 98 F | DIASTOLIC BLOOD PRESSURE: 91 MMHG

## 2024-10-21 DIAGNOSIS — S63.502A WRIST SPRAIN, LEFT, INITIAL ENCOUNTER: Primary | ICD-10-CM

## 2024-10-21 DIAGNOSIS — M77.8 LEFT WRIST TENDONITIS: ICD-10-CM

## 2024-10-21 DIAGNOSIS — R52 PAIN: ICD-10-CM

## 2024-10-21 PROCEDURE — 25000003 PHARM REV CODE 250: Performed by: EMERGENCY MEDICINE

## 2024-10-21 PROCEDURE — 99283 EMERGENCY DEPT VISIT LOW MDM: CPT | Mod: 25

## 2024-10-21 RX ORDER — KETOROLAC TROMETHAMINE 10 MG/1
10 TABLET, FILM COATED ORAL EVERY 6 HOURS PRN
Qty: 20 TABLET | Refills: 0 | Status: SHIPPED | OUTPATIENT
Start: 2024-10-21 | End: 2024-10-26

## 2024-10-21 RX ORDER — KETOROLAC TROMETHAMINE 10 MG/1
10 TABLET, FILM COATED ORAL
Status: COMPLETED | OUTPATIENT
Start: 2024-10-21 | End: 2024-10-21

## 2024-10-21 RX ADMIN — KETOROLAC TROMETHAMINE 10 MG: 10 TABLET, FILM COATED ORAL at 07:10

## 2024-10-21 NOTE — ED PROVIDER NOTES
Encounter Date: 10/21/2024       History     Chief Complaint   Patient presents with    Wrist Pain     C/O L wrist pain x 1 week, denies taking anything for pain.      29-year-old otherwise healthy ambidextrous male presenting to the ED for evaluation of left 1st pain.  He states that he slid down a water slide in August and since then has had intermittent wrist pain.  It was worsened over the past week with a cold or weather.  He would not take any medication to help with the pain but states he has been wrapping without athletic tape that improved some of his symptoms.  Pain is a burning type pain worse with movement left lateral wrist    The history is provided by the patient. No  was used.     Review of patient's allergies indicates:  No Known Allergies  Past Medical History:   Diagnosis Date    Mild intermittent asthma, uncomplicated      Past Surgical History:   Procedure Laterality Date    FLEXOR TENDON REPAIR Right 1/20/2023    Procedure: REPAIR, TENDON, FLEXOR  Two tendons each in Four fingers. MAC and supraclav;  Surgeon: Artur Adame MD;  Location: [facility] OR;  Service: Orthopedics;  Laterality: Right;  Two tendons each in Four fingers. MAC and supraclav    MYRINGOTOMY W/ TUBES Bilateral     childhood years    REPAIR, NERVE USING ALLOGRAFT Right 1/20/2023    Procedure: REPAIR, NERVE USING ALLOGRAFT  Both nerves in 4 fingers;  Surgeon: Artur Adame MD;  Location: [facility] OR;  Service: Orthopedics;  Laterality: Right;  Both nerves in 4 fingers     Family History   Problem Relation Name Age of Onset    Throat cancer Mother      Heart attack Father       Social History     Tobacco Use    Smoking status: Every Day     Current packs/day: 1.00     Types: Cigarettes    Smokeless tobacco: Current   Substance Use Topics    Alcohol use: Never    Drug use: Never     Review of Systems    Physical Exam     Initial Vitals [10/21/24 0654]   BP Pulse Resp Temp SpO2   (!) 154/79 90 16 98 °F (36.7 °C) 99 %       MAP       --         Physical Exam    Nursing note and vitals reviewed.  Constitutional: He appears well-developed and well-nourished. He is not diaphoretic. No distress.   HENT:   Head: Normocephalic and atraumatic.   Nose: Nose normal.   Eyes: Conjunctivae and EOM are normal.   Cardiovascular:  Normal rate and regular rhythm.           Pulmonary/Chest: No respiratory distress. He has no wheezes.   Abdominal: He exhibits no distension.   Musculoskeletal:         General: Tenderness present. No edema. Normal range of motion.      Comments: Good range of motion flexing towards the little finger exacerbates the pain there was no effusion or deformity     Neurological: He is alert and oriented to person, place, and time. He has normal strength. No cranial nerve deficit or sensory deficit.   Skin: Skin is warm and dry. Capillary refill takes less than 2 seconds. No rash and no abscess noted. No erythema. No pallor.         ED Course   Procedures  Labs Reviewed - No data to display       Imaging Results              X-Ray Wrist Complete Left (In process)                   X-Rays:   Independently Interpreted Readings:   Other Readings:  Wrist xr without acute fracture or dislocation or joint effusion    Medications   ketorolac tablet 10 mg (has no administration in time range)     Medical Decision Making  Given patient's presentation, differential diagnosis includes but is not limited to fracture, strain, tendonitis  To evaluate these  possible etiologies xr wrist were ordered and reviewed  S/s consistent with tendonitis, discussed symptmoatic treatment and he voiced understanding mingo yoder and is comforatable withplan    Problems Addressed:  Left wrist tendonitis: acute illness or injury that poses a threat to life or bodily functions  Pain: acute illness or injury that poses a threat to life or bodily functions  Wrist sprain, left, initial encounter: acute illness or injury that poses a threat to life or bodily  functions    Amount and/or Complexity of Data Reviewed  External Data Reviewed: notes.     Details: Noncontributory, previous right hand injury  Radiology: ordered and independent interpretation performed.    Risk  OTC drugs.  Prescription drug management.                                      Clinical Impression:  Final diagnoses:  [R52] Pain  [S63.502A] Wrist sprain, left, initial encounter (Primary)  [M77.8] Left wrist tendonitis                 Autumn Fabian MD  10/21/24 0720

## 2025-05-23 DIAGNOSIS — S62.669A CLOSED NONDISPLACED FRACTURE OF DISTAL PHALANX OF FINGER OF LEFT HAND: Primary | ICD-10-CM

## (undated) DEVICE — GLOVE PROTEXIS LTX MICRO  7

## (undated) DEVICE — SPONGE DERMACEA GAUZE 4X4

## (undated) DEVICE — DRESSING XEROFORM GAUZE 5X9

## (undated) DEVICE — DRESSING XEROFORM FOIL PK 1X8

## (undated) DEVICE — BANDAGE VELCLOSE ELAS 2INX5YD

## (undated) DEVICE — SOL NACL IRR 1000ML BTL

## (undated) DEVICE — BANDAGE VELCLOSE ELAS 3INX5YD

## (undated) DEVICE — BANDAGE ACE NON LATEX 3IN

## (undated) DEVICE — SUT 4-0 ETHILON 18 PS-2

## (undated) DEVICE — CUFF ATS 2 PORT SNGL BLDR 18IN

## (undated) DEVICE — CORD BIPOLAR 12 FOOT

## (undated) DEVICE — Device

## (undated) DEVICE — BANDAGE ACE NON LATEX 2IN

## (undated) DEVICE — APPLICATOR CHLORAPREP ORN 26ML

## (undated) DEVICE — ELECTRODE PATIENT RETURN DISP

## (undated) DEVICE — GLOVE PROTEXIS BLUE LATEX 7.5

## (undated) DEVICE — SPLINT PLASTER F.S. 3INX15IN

## (undated) DEVICE — DRAPE HAND STERILE

## (undated) DEVICE — SUT SUPRAMID 3-0 BB 12X.375IN

## (undated) DEVICE — DRAPE SURG W/TWL 17 5/8X23

## (undated) DEVICE — BANDAGE ESMARK LATEX FREE 4INX

## (undated) DEVICE — SEALANT VISTASEAL FIBRIN 10ML

## (undated) DEVICE — SPLINT PLASTER F.S 4INX15IN

## (undated) DEVICE — SPONGE KERLIX SUPER 6X6.75IN